# Patient Record
Sex: FEMALE | Race: WHITE | NOT HISPANIC OR LATINO | URBAN - METROPOLITAN AREA
[De-identification: names, ages, dates, MRNs, and addresses within clinical notes are randomized per-mention and may not be internally consistent; named-entity substitution may affect disease eponyms.]

---

## 2017-05-12 ENCOUNTER — APPOINTMENT (OUTPATIENT)
Age: 27
Setting detail: DERMATOLOGY
End: 2017-05-24

## 2017-05-12 DIAGNOSIS — D22 MELANOCYTIC NEVI: ICD-10-CM

## 2017-05-12 DIAGNOSIS — L57.8 OTHER SKIN CHANGES DUE TO CHRONIC EXPOSURE TO NONIONIZING RADIATION: ICD-10-CM

## 2017-05-12 DIAGNOSIS — L72.8 OTHER FOLLICULAR CYSTS OF THE SKIN AND SUBCUTANEOUS TISSUE: ICD-10-CM

## 2017-05-12 DIAGNOSIS — D485 NEOPLASM OF UNCERTAIN BEHAVIOR OF SKIN: ICD-10-CM

## 2017-05-12 PROBLEM — D22.5 MELANOCYTIC NEVI OF TRUNK: Status: ACTIVE | Noted: 2017-05-12

## 2017-05-12 PROBLEM — D48.5 NEOPLASM OF UNCERTAIN BEHAVIOR OF SKIN: Status: ACTIVE | Noted: 2017-05-12

## 2017-05-12 PROCEDURE — 99213 OFFICE O/P EST LOW 20 MIN: CPT | Mod: 25

## 2017-05-12 PROCEDURE — OTHER BIOPSY BY SHAVE METHOD: OTHER

## 2017-05-12 PROCEDURE — OTHER PRESCRIPTION: OTHER

## 2017-05-12 PROCEDURE — OTHER COUNSELING: OTHER

## 2017-05-12 PROCEDURE — 11100: CPT

## 2017-05-12 RX ORDER — AZELAIC ACID 0.15 G/G
AEROSOL, FOAM TOPICAL
Qty: 1 | Refills: 2 | Status: ERX | COMMUNITY
Start: 2017-05-12

## 2017-05-12 RX ORDER — CLINDAMYCIN PHOS/BENZOYL PEROX 1 %-5 %
GEL (GRAM) TOPICAL
Qty: 1 | Refills: 2 | Status: ERX | COMMUNITY
Start: 2017-05-12

## 2017-05-12 ASSESSMENT — LOCATION DETAILED DESCRIPTION DERM
LOCATION DETAILED: LEFT DISTAL DORSAL FOREARM
LOCATION DETAILED: SUPERIOR LUMBAR SPINE
LOCATION DETAILED: UPPER STERNUM
LOCATION DETAILED: RIGHT PROXIMAL DORSAL FOREARM
LOCATION DETAILED: LEFT MEDIAL BREAST 7-8:00 REGION
LOCATION DETAILED: RIGHT MEDIAL TRAPEZIAL NECK
LOCATION DETAILED: RIGHT INFERIOR MEDIAL FOREHEAD
LOCATION DETAILED: RIGHT RIB CAGE
LOCATION DETAILED: LEFT SUPERIOR UPPER BACK
LOCATION DETAILED: LEFT LATERAL ABDOMEN
LOCATION DETAILED: LEFT SUPERIOR LATERAL LOWER BACK

## 2017-05-12 ASSESSMENT — LOCATION ZONE DERM
LOCATION ZONE: FACE
LOCATION ZONE: NECK
LOCATION ZONE: ARM
LOCATION ZONE: TRUNK

## 2017-05-12 ASSESSMENT — LOCATION SIMPLE DESCRIPTION DERM
LOCATION SIMPLE: CHEST
LOCATION SIMPLE: LEFT UPPER BACK
LOCATION SIMPLE: POSTERIOR NECK
LOCATION SIMPLE: LOWER BACK
LOCATION SIMPLE: RIGHT FOREARM
LOCATION SIMPLE: RIGHT FOREHEAD
LOCATION SIMPLE: LEFT FOREARM
LOCATION SIMPLE: LEFT BREAST
LOCATION SIMPLE: ABDOMEN
LOCATION SIMPLE: LEFT LOWER BACK

## 2017-05-12 NOTE — PROCEDURE: BIOPSY BY SHAVE METHOD
Dressing: bandage
Silver Nitrate Text: The wound bed was treated with silver nitrate after the biopsy was performed.
Size Of Lesion In Cm: 0
Type Of Destruction Used: Curettage
Electrodesiccation And Curettage Text: The wound bed was treated with electrodesiccation and curettage after the biopsy was performed.
Destruction After The Procedure: No
Hemostasis: Drysol
Biopsy Method: Dermablade
Cryotherapy Text: The wound bed was treated with cryotherapy after the biopsy was performed.
Notification Instructions: Patient will be notified of biopsy results. However, patient instructed to call the office if not contacted within 2 weeks.
Electrodesiccation Text: The wound bed was treated with electrodesiccation after the biopsy was performed.
Wound Care: Vaseline
Detail Level: Detailed
Anesthesia Type: 1% Xylocaine with epinephrine
Render Post-Care Instructions In Note?: yes
Consent: Written consent was obtained and risks were reviewed including but not limited to scarring, infection, bleeding, scabbing, incomplete removal, nerve damage and allergy to anesthesia.
Biopsy Type: H and E
Anesthesia Volume In Cc (Will Not Render If 0): 0.5
Post-Care Instructions: I reviewed with the patient in detail post-care instructions. Patient is to keep the biopsy site dry overnight, and then apply bacitracin twice daily until healed. Patient may apply hydrogen peroxide soaks to remove any crusting.
Billing Type: Third-Party Bill

## 2018-03-15 ENCOUNTER — APPOINTMENT (OUTPATIENT)
Age: 28
Setting detail: DERMATOLOGY
End: 2018-04-13

## 2018-03-15 DIAGNOSIS — D485 NEOPLASM OF UNCERTAIN BEHAVIOR OF SKIN: ICD-10-CM

## 2018-03-15 DIAGNOSIS — L81.4 OTHER MELANIN HYPERPIGMENTATION: ICD-10-CM

## 2018-03-15 DIAGNOSIS — D22 MELANOCYTIC NEVI: ICD-10-CM

## 2018-03-15 PROBLEM — D22.71 MELANOCYTIC NEVI OF RIGHT LOWER LIMB, INCLUDING HIP: Status: ACTIVE | Noted: 2018-03-15

## 2018-03-15 PROBLEM — D48.5 NEOPLASM OF UNCERTAIN BEHAVIOR OF SKIN: Status: ACTIVE | Noted: 2018-03-15

## 2018-03-15 PROBLEM — D22.5 MELANOCYTIC NEVI OF TRUNK: Status: ACTIVE | Noted: 2018-03-15

## 2018-03-15 PROBLEM — D22.62 MELANOCYTIC NEVI OF LEFT UPPER LIMB, INCLUDING SHOULDER: Status: ACTIVE | Noted: 2018-03-15

## 2018-03-15 PROBLEM — D22.72 MELANOCYTIC NEVI OF LEFT LOWER LIMB, INCLUDING HIP: Status: ACTIVE | Noted: 2018-03-15

## 2018-03-15 PROBLEM — D22.61 MELANOCYTIC NEVI OF RIGHT UPPER LIMB, INCLUDING SHOULDER: Status: ACTIVE | Noted: 2018-03-15

## 2018-03-15 PROCEDURE — OTHER BIOPSY BY SHAVE METHOD: OTHER

## 2018-03-15 PROCEDURE — 99213 OFFICE O/P EST LOW 20 MIN: CPT | Mod: 25

## 2018-03-15 PROCEDURE — OTHER COUNSELING: OTHER

## 2018-03-15 PROCEDURE — OTHER MIPS QUALITY: OTHER

## 2018-03-15 PROCEDURE — 11101: CPT

## 2018-03-15 PROCEDURE — 11100: CPT

## 2018-03-15 ASSESSMENT — LOCATION DETAILED DESCRIPTION DERM
LOCATION DETAILED: LEFT PROXIMAL PRETIBIAL REGION
LOCATION DETAILED: LEFT PROXIMAL DORSAL FOREARM
LOCATION DETAILED: RIGHT PROXIMAL DORSAL FOREARM
LOCATION DETAILED: RIGHT PROXIMAL POSTERIOR UPPER ARM
LOCATION DETAILED: LEFT SUPERIOR MEDIAL UPPER BACK
LOCATION DETAILED: RIGHT PROXIMAL PRETIBIAL REGION
LOCATION DETAILED: RIGHT SUPERIOR MEDIAL UPPER BACK
LOCATION DETAILED: EPIGASTRIC SKIN
LOCATION DETAILED: LEFT ANTERIOR DISTAL THIGH
LOCATION DETAILED: LEFT SUPERIOR LATERAL UPPER BACK
LOCATION DETAILED: RIGHT ANTERIOR DISTAL THIGH
LOCATION DETAILED: LEFT SUPERIOR LATERAL LOWER BACK
LOCATION DETAILED: SUBXIPHOID
LOCATION DETAILED: SUPERIOR LUMBAR SPINE
LOCATION DETAILED: LEFT PROXIMAL POSTERIOR UPPER ARM

## 2018-03-15 ASSESSMENT — LOCATION SIMPLE DESCRIPTION DERM
LOCATION SIMPLE: LEFT THIGH
LOCATION SIMPLE: RIGHT FOREARM
LOCATION SIMPLE: RIGHT POSTERIOR UPPER ARM
LOCATION SIMPLE: LEFT PRETIBIAL REGION
LOCATION SIMPLE: ABDOMEN
LOCATION SIMPLE: RIGHT UPPER BACK
LOCATION SIMPLE: LEFT FOREARM
LOCATION SIMPLE: LEFT POSTERIOR UPPER ARM
LOCATION SIMPLE: RIGHT PRETIBIAL REGION
LOCATION SIMPLE: LEFT UPPER BACK
LOCATION SIMPLE: RIGHT THIGH
LOCATION SIMPLE: LEFT LOWER BACK
LOCATION SIMPLE: LOWER BACK

## 2018-03-15 ASSESSMENT — LOCATION ZONE DERM
LOCATION ZONE: ARM
LOCATION ZONE: TRUNK
LOCATION ZONE: LEG

## 2018-03-15 NOTE — PROCEDURE: MIPS QUALITY
Quality 431: Preventive Care And Screening: Unhealthy Alcohol Use - Screening: Patient screened for unhealthy alcohol use using a single question and scores 2 or greater episodes per year and brief intervention did not occur
Quality 226: Preventive Care And Screening: Tobacco Use: Screening And Cessation Intervention: Patient screened for tobacco and never smoked
Quality 110: Preventive Care And Screening: Influenza Immunization: Influenza Immunization previously received during influenza season
Detail Level: Detailed

## 2018-03-15 NOTE — PROCEDURE: BIOPSY BY SHAVE METHOD
Render Post-Care Instructions In Note?: yes
Electrodesiccation Text: The wound bed was treated with electrodesiccation after the biopsy was performed.
Anesthesia Volume In Cc (Will Not Render If 0): 0.3
Hemostasis: Drysol
Billing Type: Third-Party Bill
Biopsy Type: H and E
Cryotherapy Text: The wound bed was treated with cryotherapy after the biopsy was performed.
Silver Nitrate Text: The wound bed was treated with silver nitrate after the biopsy was performed.
Notification Instructions: Patient will be notified of biopsy results. However, patient instructed to call the office if not contacted within 2 weeks.
Detail Level: Detailed
Dressing: bandage
Electrodesiccation And Curettage Text: The wound bed was treated with electrodesiccation and curettage after the biopsy was performed.
Bill For Surgical Tray: no
Wound Care: Vaseline
Consent: Written consent was obtained and risks were reviewed including but not limited to scarring, infection, bleeding, scabbing, incomplete removal, nerve damage and allergy to anesthesia.
Type Of Destruction Used: Curettage
Post-Care Instructions: I reviewed with the patient in detail post-care instructions. Patient is to keep the biopsy site dry overnight, and then apply Vaseline twice daily until healed. Patient may apply hydrogen peroxide soaks to remove any crusting.
Anesthesia Type: 2% lidocaine with epinephrine
X Size Of Lesion In Cm: 0

## 2018-09-13 ENCOUNTER — APPOINTMENT (OUTPATIENT)
Age: 28
Setting detail: DERMATOLOGY
End: 2018-09-23

## 2018-09-13 DIAGNOSIS — Z87.2 PERSONAL HISTORY OF DISEASES OF THE SKIN AND SUBCUTANEOUS TISSUE: ICD-10-CM

## 2018-09-13 DIAGNOSIS — D22 MELANOCYTIC NEVI: ICD-10-CM

## 2018-09-13 DIAGNOSIS — L81.4 OTHER MELANIN HYPERPIGMENTATION: ICD-10-CM

## 2018-09-13 PROBLEM — D22.71 MELANOCYTIC NEVI OF RIGHT LOWER LIMB, INCLUDING HIP: Status: ACTIVE | Noted: 2018-09-13

## 2018-09-13 PROCEDURE — 99213 OFFICE O/P EST LOW 20 MIN: CPT

## 2018-09-13 PROCEDURE — OTHER COUNSELING: OTHER

## 2018-09-13 ASSESSMENT — LOCATION SIMPLE DESCRIPTION DERM
LOCATION SIMPLE: LEFT UPPER BACK
LOCATION SIMPLE: LEFT LOWER BACK
LOCATION SIMPLE: LOWER BACK
LOCATION SIMPLE: LEFT SHOULDER
LOCATION SIMPLE: RIGHT POPLITEAL SKIN

## 2018-09-13 ASSESSMENT — LOCATION ZONE DERM
LOCATION ZONE: TRUNK
LOCATION ZONE: LEG
LOCATION ZONE: ARM

## 2018-09-13 ASSESSMENT — LOCATION DETAILED DESCRIPTION DERM
LOCATION DETAILED: LEFT SUPERIOR LATERAL LOWER BACK
LOCATION DETAILED: LEFT SUPERIOR LATERAL UPPER BACK
LOCATION DETAILED: LEFT ANTERIOR SHOULDER
LOCATION DETAILED: SUPERIOR LUMBAR SPINE
LOCATION DETAILED: RIGHT POPLITEAL SKIN

## 2018-12-14 ENCOUNTER — OFFICE VISIT (OUTPATIENT)
Dept: URBAN - METROPOLITAN AREA CLINIC 32 | Facility: CLINIC | Age: 28
End: 2018-12-14
Payer: COMMERCIAL

## 2018-12-14 PROCEDURE — 92310 CONTACT LENS FITTING OU: CPT | Performed by: OPTOMETRIST

## 2018-12-14 PROCEDURE — 92014 COMPRE OPH EXAM EST PT 1/>: CPT | Performed by: OPTOMETRIST

## 2018-12-14 PROCEDURE — 92015 DETERMINE REFRACTIVE STATE: CPT | Performed by: OPTOMETRIST

## 2018-12-14 ASSESSMENT — INTRAOCULAR PRESSURE
OD: 15
OS: 15

## 2018-12-14 ASSESSMENT — VISUAL ACUITY
OD: 20/20
OS: 20/20

## 2019-03-15 ENCOUNTER — APPOINTMENT (OUTPATIENT)
Age: 29
Setting detail: DERMATOLOGY
End: 2019-03-18

## 2019-03-15 DIAGNOSIS — D485 NEOPLASM OF UNCERTAIN BEHAVIOR OF SKIN: ICD-10-CM

## 2019-03-15 DIAGNOSIS — L57.8 OTHER SKIN CHANGES DUE TO CHRONIC EXPOSURE TO NONIONIZING RADIATION: ICD-10-CM

## 2019-03-15 DIAGNOSIS — D18.0 HEMANGIOMA: ICD-10-CM

## 2019-03-15 DIAGNOSIS — Z71.89 OTHER SPECIFIED COUNSELING: ICD-10-CM

## 2019-03-15 DIAGNOSIS — D22 MELANOCYTIC NEVI: ICD-10-CM

## 2019-03-15 DIAGNOSIS — Z87.2 PERSONAL HISTORY OF DISEASES OF THE SKIN AND SUBCUTANEOUS TISSUE: ICD-10-CM

## 2019-03-15 PROBLEM — D22.72 MELANOCYTIC NEVI OF LEFT LOWER LIMB, INCLUDING HIP: Status: ACTIVE | Noted: 2019-03-15

## 2019-03-15 PROBLEM — D18.01 HEMANGIOMA OF SKIN AND SUBCUTANEOUS TISSUE: Status: ACTIVE | Noted: 2019-03-15

## 2019-03-15 PROBLEM — D22.61 MELANOCYTIC NEVI OF RIGHT UPPER LIMB, INCLUDING SHOULDER: Status: ACTIVE | Noted: 2019-03-15

## 2019-03-15 PROBLEM — D48.5 NEOPLASM OF UNCERTAIN BEHAVIOR OF SKIN: Status: ACTIVE | Noted: 2019-03-15

## 2019-03-15 PROBLEM — D22.5 MELANOCYTIC NEVI OF TRUNK: Status: ACTIVE | Noted: 2019-03-15

## 2019-03-15 PROBLEM — D22.71 MELANOCYTIC NEVI OF RIGHT LOWER LIMB, INCLUDING HIP: Status: ACTIVE | Noted: 2019-03-15

## 2019-03-15 PROBLEM — D22.62 MELANOCYTIC NEVI OF LEFT UPPER LIMB, INCLUDING SHOULDER: Status: ACTIVE | Noted: 2019-03-15

## 2019-03-15 PROCEDURE — OTHER BIOPSY BY SHAVE METHOD: OTHER

## 2019-03-15 PROCEDURE — 11102 TANGNTL BX SKIN SINGLE LES: CPT

## 2019-03-15 PROCEDURE — 99213 OFFICE O/P EST LOW 20 MIN: CPT | Mod: 25

## 2019-03-15 PROCEDURE — OTHER COUNSELING: OTHER

## 2019-03-15 PROCEDURE — OTHER MIPS QUALITY: OTHER

## 2019-03-15 ASSESSMENT — LOCATION DETAILED DESCRIPTION DERM
LOCATION DETAILED: RIGHT DISTAL DORSAL FOREARM
LOCATION DETAILED: LEFT SUPERIOR LATERAL LOWER BACK
LOCATION DETAILED: PERIUMBILICAL SKIN
LOCATION DETAILED: RIGHT DISTAL PRETIBIAL REGION
LOCATION DETAILED: LEFT LATERAL ABDOMEN
LOCATION DETAILED: LEFT ANTERIOR DISTAL THIGH
LOCATION DETAILED: SUPERIOR LUMBAR SPINE
LOCATION DETAILED: RIGHT PROXIMAL POSTERIOR UPPER ARM
LOCATION DETAILED: LEFT DISTAL PRETIBIAL REGION
LOCATION DETAILED: RIGHT ANTERIOR DISTAL THIGH
LOCATION DETAILED: EPIGASTRIC SKIN
LOCATION DETAILED: LEFT PROXIMAL POSTERIOR UPPER ARM
LOCATION DETAILED: LEFT SUPERIOR LATERAL UPPER BACK
LOCATION DETAILED: LEFT DISTAL DORSAL FOREARM
LOCATION DETAILED: LEFT INFERIOR MEDIAL UPPER BACK
LOCATION DETAILED: RIGHT SUPERIOR MEDIAL FOREHEAD

## 2019-03-15 ASSESSMENT — LOCATION ZONE DERM
LOCATION ZONE: LEG
LOCATION ZONE: ARM
LOCATION ZONE: FACE
LOCATION ZONE: TRUNK

## 2019-03-15 ASSESSMENT — LOCATION SIMPLE DESCRIPTION DERM
LOCATION SIMPLE: RIGHT POSTERIOR UPPER ARM
LOCATION SIMPLE: LEFT THIGH
LOCATION SIMPLE: LEFT UPPER BACK
LOCATION SIMPLE: RIGHT FOREHEAD
LOCATION SIMPLE: RIGHT THIGH
LOCATION SIMPLE: ABDOMEN
LOCATION SIMPLE: LEFT PRETIBIAL REGION
LOCATION SIMPLE: LEFT LOWER BACK
LOCATION SIMPLE: LEFT POSTERIOR UPPER ARM
LOCATION SIMPLE: LEFT FOREARM
LOCATION SIMPLE: LOWER BACK
LOCATION SIMPLE: RIGHT PRETIBIAL REGION
LOCATION SIMPLE: RIGHT FOREARM

## 2019-03-15 NOTE — PROCEDURE: BIOPSY BY SHAVE METHOD
Depth Of Biopsy: dermis
Anesthesia Type: 1% Xylocaine without epinephrine
Anesthesia Volume In Cc (Will Not Render If 0): 0.5
Wound Care: Vaseline
Additional Anesthesia Volume In Cc (Will Not Render If 0): 0
Consent: Written consent was obtained and risks were reviewed including but not limited to scarring, infection, bleeding, scabbing, incomplete removal, nerve damage and allergy to anesthesia.
Biopsy Type: H and E
Hemostasis: Electrocautery
Billing Type: Third-Party Bill
Silver Nitrate Text: The wound bed was treated with silver nitrate after the biopsy was performed.
Notification Instructions: Patient will be notified of biopsy results. However, patient instructed to call the office if not contacted within 2 weeks.
Cryotherapy Text: The wound bed was treated with cryotherapy after the biopsy was performed.
Bill [id number] For Specimen Handling/Conveyance To Laboratory?: no
Render Post-Care Instructions In Note?: yes
Dressing: bandage
Electrodesiccation And Curettage Text: The wound bed was treated with electrodesiccation and curettage after the biopsy was performed.
Post-Care Instructions: I reviewed with the patient in detail post-care instructions. Patient is to keep the biopsy site dry overnight, and then apply Vaseline twice daily until healed. Patient may apply hydrogen peroxide soaks to remove any crusting.
Detail Level: Detailed
Electrodesiccation Text: The wound bed was treated with electrodesiccation after the biopsy was performed.
Biopsy Method: Personna blade
Type Of Destruction Used: Curettage

## 2019-04-25 ENCOUNTER — APPOINTMENT (OUTPATIENT)
Age: 29
Setting detail: DERMATOLOGY
End: 2019-04-30

## 2019-04-25 DIAGNOSIS — D22 MELANOCYTIC NEVI: ICD-10-CM

## 2019-04-25 PROBLEM — D22.5 MELANOCYTIC NEVI OF TRUNK: Status: ACTIVE | Noted: 2019-04-25

## 2019-04-25 PROCEDURE — 11402 EXC TR-EXT B9+MARG 1.1-2 CM: CPT

## 2019-04-25 PROCEDURE — OTHER EXCISION: OTHER

## 2019-04-25 PROCEDURE — 12031 INTMD RPR S/A/T/EXT 2.5 CM/<: CPT

## 2019-04-25 PROCEDURE — OTHER COUNSELING: OTHER

## 2019-04-25 ASSESSMENT — LOCATION ZONE DERM: LOCATION ZONE: TRUNK

## 2019-04-25 ASSESSMENT — LOCATION DETAILED DESCRIPTION DERM: LOCATION DETAILED: LEFT LATERAL ABDOMEN

## 2019-04-25 ASSESSMENT — LOCATION SIMPLE DESCRIPTION DERM: LOCATION SIMPLE: ABDOMEN

## 2019-04-25 NOTE — PROCEDURE: EXCISION
Anesthesia Type: 1% lidocaine with 1:300,000 epinephrine and a 1:10 solution of 8.4% sodium bicarbonate

## 2019-04-25 NOTE — PROCEDURE: EXCISION
Medical Necessity Clause: This procedure was medically necessary because the lesion that was treated was: moderate Dysplastic Nevus

## 2019-05-07 ENCOUNTER — APPOINTMENT (OUTPATIENT)
Age: 29
Setting detail: DERMATOLOGY
End: 2019-05-09

## 2019-05-07 DIAGNOSIS — Z48.02 ENCOUNTER FOR REMOVAL OF SUTURES: ICD-10-CM

## 2019-05-07 PROCEDURE — OTHER SUTURE REMOVAL (GLOBAL PERIOD): OTHER

## 2019-05-07 PROCEDURE — OTHER COUNSELING: OTHER

## 2019-05-07 ASSESSMENT — LOCATION SIMPLE DESCRIPTION DERM: LOCATION SIMPLE: ABDOMEN

## 2019-05-07 ASSESSMENT — LOCATION DETAILED DESCRIPTION DERM: LOCATION DETAILED: PERIUMBILICAL SKIN

## 2019-05-07 ASSESSMENT — LOCATION ZONE DERM: LOCATION ZONE: TRUNK

## 2019-05-07 NOTE — PROCEDURE: SUTURE REMOVAL (GLOBAL PERIOD)
Add 17947 Cpt? (Important Note: In 2017 The Use Of 01792 Is Being Tracked By Cms To Determine Future Global Period Reimbursement For Global Periods): no
Detail Level: Simple

## 2019-09-13 ENCOUNTER — APPOINTMENT (OUTPATIENT)
Age: 29
Setting detail: DERMATOLOGY
End: 2019-09-13

## 2019-09-13 DIAGNOSIS — Z87.2 PERSONAL HISTORY OF DISEASES OF THE SKIN AND SUBCUTANEOUS TISSUE: ICD-10-CM

## 2019-09-13 PROCEDURE — OTHER COUNSELING: OTHER

## 2019-09-13 ASSESSMENT — LOCATION DETAILED DESCRIPTION DERM: LOCATION DETAILED: LEFT SUPERIOR LATERAL LOWER BACK

## 2019-09-13 ASSESSMENT — LOCATION SIMPLE DESCRIPTION DERM: LOCATION SIMPLE: LEFT LOWER BACK

## 2019-09-13 ASSESSMENT — LOCATION ZONE DERM: LOCATION ZONE: TRUNK

## 2019-10-23 ENCOUNTER — APPOINTMENT (OUTPATIENT)
Age: 29
Setting detail: DERMATOLOGY
End: 2019-10-28

## 2019-10-23 DIAGNOSIS — Z71.89 OTHER SPECIFIED COUNSELING: ICD-10-CM

## 2019-10-23 DIAGNOSIS — D485 NEOPLASM OF UNCERTAIN BEHAVIOR OF SKIN: ICD-10-CM

## 2019-10-23 DIAGNOSIS — Z87.2 PERSONAL HISTORY OF DISEASES OF THE SKIN AND SUBCUTANEOUS TISSUE: ICD-10-CM

## 2019-10-23 DIAGNOSIS — D22 MELANOCYTIC NEVI: ICD-10-CM

## 2019-10-23 DIAGNOSIS — L60.0 INGROWING NAIL: ICD-10-CM

## 2019-10-23 DIAGNOSIS — L57.8 OTHER SKIN CHANGES DUE TO CHRONIC EXPOSURE TO NONIONIZING RADIATION: ICD-10-CM

## 2019-10-23 PROBLEM — D22.5 MELANOCYTIC NEVI OF TRUNK: Status: ACTIVE | Noted: 2019-10-23

## 2019-10-23 PROBLEM — D48.5 NEOPLASM OF UNCERTAIN BEHAVIOR OF SKIN: Status: ACTIVE | Noted: 2019-10-23

## 2019-10-23 PROCEDURE — OTHER COUNSELING: OTHER

## 2019-10-23 PROCEDURE — 11401 EXC TR-EXT B9+MARG 0.6-1 CM: CPT

## 2019-10-23 PROCEDURE — 99214 OFFICE O/P EST MOD 30 MIN: CPT | Mod: 25

## 2019-10-23 PROCEDURE — OTHER TREATMENT REGIMEN: OTHER

## 2019-10-23 PROCEDURE — 11402 EXC TR-EXT B9+MARG 1.1-2 CM: CPT

## 2019-10-23 PROCEDURE — OTHER EXCISION: OTHER

## 2019-10-23 ASSESSMENT — LOCATION SIMPLE DESCRIPTION DERM
LOCATION SIMPLE: RIGHT GREAT TOE
LOCATION SIMPLE: LOWER BACK
LOCATION SIMPLE: ABDOMEN
LOCATION SIMPLE: LEFT LOWER BACK
LOCATION SIMPLE: UPPER BACK
LOCATION SIMPLE: LEFT BUTTOCK
LOCATION SIMPLE: LEFT UPPER BACK
LOCATION SIMPLE: CHEST
LOCATION SIMPLE: LEFT FOREHEAD
LOCATION SIMPLE: RIGHT UPPER BACK

## 2019-10-23 ASSESSMENT — LOCATION DETAILED DESCRIPTION DERM
LOCATION DETAILED: INFERIOR THORACIC SPINE
LOCATION DETAILED: LEFT LATERAL ABDOMEN
LOCATION DETAILED: EPIGASTRIC SKIN
LOCATION DETAILED: LEFT BUTTOCK
LOCATION DETAILED: RIGHT MEDIAL UPPER BACK
LOCATION DETAILED: RIGHT LATERAL GREAT TOE
LOCATION DETAILED: LEFT MEDIAL FOREHEAD
LOCATION DETAILED: SUPERIOR LUMBAR SPINE
LOCATION DETAILED: LEFT SUPERIOR LATERAL UPPER BACK
LOCATION DETAILED: LEFT MEDIAL SUPERIOR CHEST
LOCATION DETAILED: LEFT SUPERIOR LATERAL LOWER BACK

## 2019-10-23 ASSESSMENT — LOCATION ZONE DERM
LOCATION ZONE: TRUNK
LOCATION ZONE: TOE
LOCATION ZONE: FACE

## 2019-10-23 NOTE — PROCEDURE: TREATMENT REGIMEN
Detail Level: Simple
Otc Regimen: Recommended OTC Epsom Salt soaks
Plan: Patient stated the toe is not bothering her too much. She was advised to see a Podiatrist if it gets worse

## 2019-12-11 ENCOUNTER — OFFICE VISIT (OUTPATIENT)
Dept: URBAN - METROPOLITAN AREA CLINIC 32 | Facility: CLINIC | Age: 29
End: 2019-12-11

## 2019-12-11 PROCEDURE — 92014 COMPRE OPH EXAM EST PT 1/>: CPT | Performed by: OPTOMETRIST

## 2019-12-11 PROCEDURE — 92310 CONTACT LENS FITTING OU: CPT | Performed by: OPTOMETRIST

## 2019-12-11 ASSESSMENT — KERATOMETRY
OS: 44.63
OD: 44.50

## 2019-12-11 ASSESSMENT — INTRAOCULAR PRESSURE
OD: 15
OS: 15

## 2019-12-11 ASSESSMENT — VISUAL ACUITY
OS: 20/20
OD: 20/25

## 2020-07-31 NOTE — PROCEDURE: EXCISION
Information: Selecting Yes will display possible errors in your note based on the variables you have selected. This validation is only offered as a suggestion for you. PLEASE NOTE THAT THE VALIDATION TEXT WILL BE REMOVED WHEN YOU FINALIZE YOUR NOTE. IF YOU WANT TO FAX A PRELIMINARY NOTE YOU WILL NEED TO TOGGLE THIS TO 'NO' IF YOU DO NOT WANT IT IN YOUR FAXED NOTE.
Composite Graft Text: The defect edges were debeveled with a #15 scalpel blade.  Given the location of the defect, shape of the defect, the proximity to free margins and the fact the defect was full thickness a composite graft was deemed most appropriate.  The defect was outline and then transferred to the donor site.  A full thickness graft was then excised from the donor site. The graft was then placed in the primary defect, oriented appropriately and then sutured into place.  The secondary defect was then repaired using a primary closure.
Mercedes Flap Text: The defect edges were debeveled with a #15 scalpel blade.  Given the location of the defect, shape of the defect and the proximity to free margins a Mercedes flap was deemed most appropriate.  Using a sterile surgical marker, an appropriate advancement flap was drawn incorporating the defect and placing the expected incisions within the relaxed skin tension lines where possible. The area thus outlined was incised deep to adipose tissue with a #15 scalpel blade.  The skin margins were undermined to an appropriate distance in all directions utilizing iris scissors.
Complex Repair And O-T Advancement Flap Text: The defect edges were debeveled with a #15 scalpel blade.  The primary defect was closed partially with a complex linear closure.  Given the location of the remaining defect, shape of the defect and the proximity to free margins an O-T advancement flap was deemed most appropriate for complete closure of the defect.  Using a sterile surgical marker, an appropriate advancement flap was drawn incorporating the defect and placing the expected incisions within the relaxed skin tension lines where possible.    The area thus outlined was incised deep to adipose tissue with a #15 scalpel blade.  The skin margins were undermined to an appropriate distance in all directions utilizing iris scissors.
Double Island Pedicle Flap Text: The defect edges were debeveled with a #15 scalpel blade.  Given the location of the defect, shape of the defect and the proximity to free margins a double island pedicle advancement flap was deemed most appropriate.  Using a sterile surgical marker, an appropriate advancement flap was drawn incorporating the defect, outlining the appropriate donor tissue and placing the expected incisions within the relaxed skin tension lines where possible.    The area thus outlined was incised deep to adipose tissue with a #15 scalpel blade.  The skin margins were undermined to an appropriate distance in all directions around the primary defect and laterally outward around the island pedicle utilizing iris scissors.  There was minimal undermining beneath the pedicle flap.
Cheek Interpolation Flap Text: A decision was made to reconstruct the defect utilizing an interpolation axial flap and a staged reconstruction.  A telfa template was made of the defect.  This telfa template was then used to outline the Cheek Interpolation flap.  The donor area for the pedicle flap was then injected with anesthesia.  The flap was excised through the skin and subcutaneous tissue down to the layer of the underlying musculature.  The interpolation flap was carefully excised within this deep plane to maintain its blood supply.  The edges of the donor site were undermined.   The donor site was closed in a primary fashion.  The pedicle was then rotated into position and sutured.  Once the tube was sutured into place, adequate blood supply was confirmed with blanching and refill.  The pedicle was then wrapped with xeroform gauze and dressed appropriately with a telfa and gauze bandage to ensure continued blood supply and protect the attached pedicle.
Dorsal Nasal Flap Text: The defect edges were debeveled with a #15 scalpel blade.  Given the location of the defect and the proximity to free margins a dorsal nasal flap was deemed most appropriate.  Using a sterile surgical marker, an appropriate dorsal nasal flap was drawn around the defect.    The area thus outlined was incised deep to adipose tissue with a #15 scalpel blade.  The skin margins were undermined to an appropriate distance in all directions utilizing iris scissors.
Ftsg Text: The defect edges were debeveled with a #15 scalpel blade.  Given the location of the defect, shape of the defect and the proximity to free margins a full thickness skin graft was deemed most appropriate.  Using a sterile surgical marker, the primary defect shape was transferred to the donor site. The area thus outlined was incised deep to adipose tissue with a #15 scalpel blade.  The harvested graft was then trimmed of adipose tissue until only dermis and epidermis was left.  The skin margins of the secondary defect were undermined to an appropriate distance in all directions utilizing iris scissors.  The secondary defect was closed with interrupted buried subcutaneous sutures.  The skin edges were then re-apposed with running  sutures.  The skin graft was then placed in the primary defect and oriented appropriately.
Size Of Lesion In Cm: 0.6
Show Accession Variable: Yes
O-Z Flap Text: The defect edges were debeveled with a #15 scalpel blade.  Given the location of the defect, shape of the defect and the proximity to free margins an O-Z flap was deemed most appropriate.  Using a sterile surgical marker, an appropriate transposition flap was drawn incorporating the defect and placing the expected incisions within the relaxed skin tension lines where possible. The area thus outlined was incised deep to adipose tissue with a #15 scalpel blade.  The skin margins were undermined to an appropriate distance in all directions utilizing iris scissors.
Spiral Flap Text: The defect edges were debeveled with a #15 scalpel blade.  Given the location of the defect, shape of the defect and the proximity to free margins a spiral flap was deemed most appropriate.  Using a sterile surgical marker, an appropriate rotation flap was drawn incorporating the defect and placing the expected incisions within the relaxed skin tension lines where possible. The area thus outlined was incised deep to adipose tissue with a #15 scalpel blade.  The skin margins were undermined to an appropriate distance in all directions utilizing iris scissors.
Interpolation Flap Text: A decision was made to reconstruct the defect utilizing an interpolation axial flap and a staged reconstruction.  A telfa template was made of the defect.  This telfa template was then used to outline the interpolation flap.  The donor area for the pedicle flap was then injected with anesthesia.  The flap was excised through the skin and subcutaneous tissue down to the layer of the underlying musculature.  The interpolation flap was carefully excised within this deep plane to maintain its blood supply.  The edges of the donor site were undermined.   The donor site was closed in a primary fashion.  The pedicle was then rotated into position and sutured.  Once the tube was sutured into place, adequate blood supply was confirmed with blanching and refill.  The pedicle was then wrapped with xeroform gauze and dressed appropriately with a telfa and gauze bandage to ensure continued blood supply and protect the attached pedicle.
O-Z Plasty Text: The defect edges were debeveled with a #15 scalpel blade.  Given the location of the defect, shape of the defect and the proximity to free margins an O-Z plasty (double transposition flap) was deemed most appropriate.  Using a sterile surgical marker, the appropriate transposition flaps were drawn incorporating the defect and placing the expected incisions within the relaxed skin tension lines where possible.    The area thus outlined was incised deep to adipose tissue with a #15 scalpel blade.  The skin margins were undermined to an appropriate distance in all directions utilizing iris scissors.  Hemostasis was achieved with electrocautery.  The flaps were then transposed into place, one clockwise and the other counterclockwise, and anchored with interrupted buried subcutaneous sutures.
Tissue Cultured Epidermal Autograft Text: The defect edges were debeveled with a #15 scalpel blade.  Given the location of the defect, shape of the defect and the proximity to free margins a tissue cultured epidermal autograft was deemed most appropriate.  The graft was then trimmed to fit the size of the defect.  The graft was then placed in the primary defect and oriented appropriately.
Lip Wedge Excision Repair Text: Given the location of the defect and the proximity to free margins a full thickness wedge repair was deemed most appropriate.  Using a sterile surgical marker, the appropriate repair was drawn incorporating the defect and placing the expected incisions perpendicular to the vermillion border.  The vermillion border was also meticulously outlined to ensure appropriate reapproximation during the repair.  The area thus outlined was incised through and through with a #15 scalpel blade.  The muscularis and dermis were reaproximated with deep sutures following hemostasis. Care was taken to realign the vermillion border before proceeding with the superficial closure.  Once the vermillion was realigned the superfical and mucosal closure was finished.
Perilesional Excision Additional Text (Leave Blank If You Do Not Want): The margin was drawn around the clinically apparent lesion. Incisions were then made along these lines to the appropriate tissue plane and the lesion was extirpated.
Anesthesia Volume In Cc: 0
Epidermal Autograft Text: The defect edges were debeveled with a #15 scalpel blade.  Given the location of the defect, shape of the defect and the proximity to free margins an epidermal autograft was deemed most appropriate.  Using a sterile surgical marker, the primary defect shape was transferred to the donor site. The epidermal graft was then harvested.  The skin graft was then placed in the primary defect and oriented appropriately.
Crescentic Complex Repair Preamble Text (Leave Blank If You Do Not Want): Extensive wide undermining was performed.
Complex Repair And Single Advancement Flap Text: The defect edges were debeveled with a #15 scalpel blade.  The primary defect was closed partially with a complex linear closure.  Given the location of the remaining defect, shape of the defect and the proximity to free margins a single advancement flap was deemed most appropriate for complete closure of the defect.  Using a sterile surgical marker, an appropriate advancement flap was drawn incorporating the defect and placing the expected incisions within the relaxed skin tension lines where possible.    The area thus outlined was incised deep to adipose tissue with a #15 scalpel blade.  The skin margins were undermined to an appropriate distance in all directions utilizing iris scissors.
Excisional Biopsy Additional Text (Leave Blank If You Do Not Want): The margin was drawn around the clinically apparent lesion. An elliptical shape was then drawn on the skin incorporating the lesion and margins.  Incisions were then made along these lines to the appropriate tissue plane and the lesion was extirpated.
Cartilage Graft Text: The defect edges were debeveled with a #15 scalpel blade.  Given the location of the defect, shape of the defect, the fact the defect involved a full thickness cartilage defect a cartilage graft was deemed most appropriate.  An appropriate donor site was identified, cleansed, and anesthetized. The cartilage graft was then harvested and transferred to the recipient site, oriented appropriately and then sutured into place.  The secondary defect was then repaired using a primary closure.
Complex Repair And Epidermal Autograft Text: The defect edges were debeveled with a #15 scalpel blade.  The primary defect was closed partially with a complex linear closure.  Given the location of the defect, shape of the defect and the proximity to free margins an epidermal autograft was deemed most appropriate to repair the remaining defect.  The graft was trimmed to fit the size of the remaining defect.  The graft was then placed in the primary defect, oriented appropriately, and sutured into place.
Muscle Hinge Flap Text: The defect edges were debeveled with a #15 scalpel blade.  Given the size, depth and location of the defect and the proximity to free margins a muscle hinge flap was deemed most appropriate.  Using a sterile surgical marker, an appropriate hinge flap was drawn incorporating the defect. The area thus outlined was incised with a #15 scalpel blade.  The skin margins were undermined to an appropriate distance in all directions utilizing iris scissors.
Z Plasty Text: The lesion was extirpated to the level of the fat with a #15 scalpel blade.  Given the location of the defect, shape of the defect and the proximity to free margins a Z-plasty was deemed most appropriate for repair.  Using a sterile surgical marker, the appropriate transposition arms of the Z-plasty were drawn incorporating the defect and placing the expected incisions within the relaxed skin tension lines where possible.    The area thus outlined was incised deep to adipose tissue with a #15 scalpel blade.  The skin margins were undermined to an appropriate distance in all directions utilizing iris scissors.  The opposing transposition arms were then transposed into place in opposite direction and anchored with interrupted buried subcutaneous sutures.
Render The Repair Note As A Separate Paragraph: No
Mucosal Advancement Flap Text: Given the location of the defect, shape of the defect and the proximity to free margins a mucosal advancement flap was deemed most appropriate. Incisions were made with a 15 blade scalpel in the appropriate fashion along the cutaneous vermillion border and the mucosal lip. The remaining actinically damaged mucosal tissue was excised.  The mucosal advancement flap was then elevated to the gingival sulcus with care taken to preserve the neurovascular structures and advanced into the primary defect. Care was taken to ensure that precise realignment of the vermillion border was achieved.
Mastoid Interpolation Flap Text: A decision was made to reconstruct the defect utilizing an interpolation axial flap and a staged reconstruction.  A telfa template was made of the defect.  This telfa template was then used to outline the mastoid interpolation flap.  The donor area for the pedicle flap was then injected with anesthesia.  The flap was excised through the skin and subcutaneous tissue down to the layer of the underlying musculature.  The pedicle flap was carefully excised within this deep plane to maintain its blood supply.  The edges of the donor site were undermined.   The donor site was closed in a primary fashion.  The pedicle was then rotated into position and sutured.  Once the tube was sutured into place, adequate blood supply was confirmed with blanching and refill.  The pedicle was then wrapped with xeroform gauze and dressed appropriately with a telfa and gauze bandage to ensure continued blood supply and protect the attached pedicle.
Excision Method: Saucerization
Complex Repair And Modified Advancement Flap Text: The defect edges were debeveled with a #15 scalpel blade.  The primary defect was closed partially with a complex linear closure.  Given the location of the remaining defect, shape of the defect and the proximity to free margins a modified advancement flap was deemed most appropriate for complete closure of the defect.  Using a sterile surgical marker, an appropriate advancement flap was drawn incorporating the defect and placing the expected incisions within the relaxed skin tension lines where possible.    The area thus outlined was incised deep to adipose tissue with a #15 scalpel blade.  The skin margins were undermined to an appropriate distance in all directions utilizing iris scissors.
M-Plasty Intermediate Repair Preamble Text (Leave Blank If You Do Not Want): Undermining was performed with blunt dissection.
Melolabial Transposition Flap Text: The defect edges were debeveled with a #15 scalpel blade.  Given the location of the defect and the proximity to free margins a melolabial flap was deemed most appropriate.  Using a sterile surgical marker, an appropriate melolabial transposition flap was drawn incorporating the defect.    The area thus outlined was incised deep to adipose tissue with a #15 scalpel blade.  The skin margins were undermined to an appropriate distance in all directions utilizing iris scissors.
Epidermal Closure: none-secondary intention
No Repair - Repaired With Adjacent Surgical Defect Text (Leave Blank If You Do Not Want): After the excision the defect was repaired concurrently with another surgical defect which was in close approximation.
Modified Advancement Flap Text: The defect edges were debeveled with a #15 scalpel blade.  Given the location of the defect, shape of the defect and the proximity to free margins a modified advancement flap was deemed most appropriate.  Using a sterile surgical marker, an appropriate advancement flap was drawn incorporating the defect and placing the expected incisions within the relaxed skin tension lines where possible.    The area thus outlined was incised deep to adipose tissue with a #15 scalpel blade.  The skin margins were undermined to an appropriate distance in all directions utilizing iris scissors.
Slit Excision Additional Text (Leave Blank If You Do Not Want): A linear line was drawn on the skin overlying the lesion. An incision was made slowly until the lesion was visualized.  Once visualized, the lesion was removed with blunt dissection.
Excision Depth: adipose tissue
Purse String (Simple) Text: Given the location of the defect and the characteristics of the surrounding skin a purse string simple closure was deemed most appropriate.  Undermining was performed circumferentially around the surgical defect.  A purse string suture was then placed and tightened.
Trilobed Flap Text: The defect edges were debeveled with a #15 scalpel blade.  Given the location of the defect and the proximity to free margins a trilobed flap was deemed most appropriate.  Using a sterile surgical marker, an appropriate trilobed flap drawn around the defect.    The area thus outlined was incised deep to adipose tissue with a #15 scalpel blade.  The skin margins were undermined to an appropriate distance in all directions utilizing iris scissors.
Dressing: Band-Aid
O-T Plasty Text: The defect edges were debeveled with a #15 scalpel blade.  Given the location of the defect, shape of the defect and the proximity to free margins an O-T plasty was deemed most appropriate.  Using a sterile surgical marker, an appropriate O-T plasty was drawn incorporating the defect and placing the expected incisions within the relaxed skin tension lines where possible.    The area thus outlined was incised deep to adipose tissue with a #15 scalpel blade.  The skin margins were undermined to an appropriate distance in all directions utilizing iris scissors.
Complex Repair And Ftsg Text: The defect edges were debeveled with a #15 scalpel blade.  The primary defect was closed partially with a complex linear closure.  Given the location of the defect, shape of the defect and the proximity to free margins a full thickness skin graft was deemed most appropriate to repair the remaining defect.  The graft was trimmed to fit the size of the remaining defect.  The graft was then placed in the primary defect, oriented appropriately, and sutured into place.
Wound Care: Vaseline
Split-Thickness Skin Graft Text: The defect edges were debeveled with a #15 scalpel blade.  Given the location of the defect, shape of the defect and the proximity to free margins a split thickness skin graft was deemed most appropriate.  Using a sterile surgical marker, the primary defect shape was transferred to the donor site. The split thickness graft was then harvested.  The skin graft was then placed in the primary defect and oriented appropriately.
Chonodrocutaneous Helical Advancement Flap Text: The defect edges were debeveled with a #15 scalpel blade.  Given the location of the defect and the proximity to free margins a chondrocutaneous helical advancement flap was deemed most appropriate.  Using a sterile surgical marker, the appropriate advancement flap was drawn incorporating the defect and placing the expected incisions within the relaxed skin tension lines where possible.    The area thus outlined was incised deep to adipose tissue with a #15 scalpel blade.  The skin margins were undermined to an appropriate distance in all directions utilizing iris scissors.
Transposition Flap Text: The defect edges were debeveled with a #15 scalpel blade.  Given the location of the defect and the proximity to free margins a transposition flap was deemed most appropriate.  Using a sterile surgical marker, an appropriate transposition flap was drawn incorporating the defect.    The area thus outlined was incised deep to adipose tissue with a #15 scalpel blade.  The skin margins were undermined to an appropriate distance in all directions utilizing iris scissors.
Anesthesia Volume In Cc: 0.5
Complex Repair And Rotation Flap Text: The defect edges were debeveled with a #15 scalpel blade.  The primary defect was closed partially with a complex linear closure.  Given the location of the remaining defect, shape of the defect and the proximity to free margins a rotation flap was deemed most appropriate for complete closure of the defect.  Using a sterile surgical marker, an appropriate advancement flap was drawn incorporating the defect and placing the expected incisions within the relaxed skin tension lines where possible.    The area thus outlined was incised deep to adipose tissue with a #15 scalpel blade.  The skin margins were undermined to an appropriate distance in all directions utilizing iris scissors.
Complex Repair And W Plasty Text: The defect edges were debeveled with a #15 scalpel blade.  The primary defect was closed partially with a complex linear closure.  Given the location of the remaining defect, shape of the defect and the proximity to free margins a W plasty was deemed most appropriate for complete closure of the defect.  Using a sterile surgical marker, an appropriate advancement flap was drawn incorporating the defect and placing the expected incisions within the relaxed skin tension lines where possible.    The area thus outlined was incised deep to adipose tissue with a #15 scalpel blade.  The skin margins were undermined to an appropriate distance in all directions utilizing iris scissors.
Keystone Flap Text: The defect edges were debeveled with a #15 scalpel blade.  Given the location of the defect, shape of the defect a keystone flap was deemed most appropriate.  Using a sterile surgical marker, an appropriate keystone flap was drawn incorporating the defect, outlining the appropriate donor tissue and placing the expected incisions within the relaxed skin tension lines where possible. The area thus outlined was incised deep to adipose tissue with a #15 scalpel blade.  The skin margins were undermined to an appropriate distance in all directions around the primary defect and laterally outward around the flap utilizing iris scissors.
Detail Level: Detailed
Island Pedicle Flap Text: The defect edges were debeveled with a #15 scalpel blade.  Given the location of the defect, shape of the defect and the proximity to free margins an island pedicle advancement flap was deemed most appropriate.  Using a sterile surgical marker, an appropriate advancement flap was drawn incorporating the defect, outlining the appropriate donor tissue and placing the expected incisions within the relaxed skin tension lines where possible.    The area thus outlined was incised deep to adipose tissue with a #15 scalpel blade.  The skin margins were undermined to an appropriate distance in all directions around the primary defect and laterally outward around the island pedicle utilizing iris scissors.  There was minimal undermining beneath the pedicle flap.
30
Home Suture Removal Text: Patient was provided a home suture removal kit and will remove their sutures at home.  If they have any questions or difficulties they will call the office.
O-L Flap Text: The defect edges were debeveled with a #15 scalpel blade.  Given the location of the defect, shape of the defect and the proximity to free margins an O-L flap was deemed most appropriate.  Using a sterile surgical marker, an appropriate advancement flap was drawn incorporating the defect and placing the expected incisions within the relaxed skin tension lines where possible.    The area thus outlined was incised deep to adipose tissue with a #15 scalpel blade.  The skin margins were undermined to an appropriate distance in all directions utilizing iris scissors.
Fusiform Excision Additional Text (Leave Blank If You Do Not Want): The margin was drawn around the clinically apparent lesion.  A fusiform shape was then drawn on the skin incorporating the lesion and margins.  Incisions were then made along these lines to the appropriate tissue plane and the lesion was extirpated.
Melolabial Interpolation Flap Text: A decision was made to reconstruct the defect utilizing an interpolation axial flap and a staged reconstruction.  A telfa template was made of the defect.  This telfa template was then used to outline the melolabial interpolation flap.  The donor area for the pedicle flap was then injected with anesthesia.  The flap was excised through the skin and subcutaneous tissue down to the layer of the underlying musculature.  The pedicle flap was carefully excised within this deep plane to maintain its blood supply.  The edges of the donor site were undermined.   The donor site was closed in a primary fashion.  The pedicle was then rotated into position and sutured.  Once the tube was sutured into place, adequate blood supply was confirmed with blanching and refill.  The pedicle was then wrapped with xeroform gauze and dressed appropriately with a telfa and gauze bandage to ensure continued blood supply and protect the attached pedicle.
Additional Anesthesia Volume In Cc: 1
O-T Advancement Flap Text: The defect edges were debeveled with a #15 scalpel blade.  Given the location of the defect, shape of the defect and the proximity to free margins an O-T advancement flap was deemed most appropriate.  Using a sterile surgical marker, an appropriate advancement flap was drawn incorporating the defect and placing the expected incisions within the relaxed skin tension lines where possible.    The area thus outlined was incised deep to adipose tissue with a #15 scalpel blade.  The skin margins were undermined to an appropriate distance in all directions utilizing iris scissors.
W Plasty Text: The lesion was extirpated to the level of the fat with a #15 scalpel blade.  Given the location of the defect, shape of the defect and the proximity to free margins a W-plasty was deemed most appropriate for repair.  Using a sterile surgical marker, the appropriate transposition arms of the W-plasty were drawn incorporating the defect and placing the expected incisions within the relaxed skin tension lines where possible.    The area thus outlined was incised deep to adipose tissue with a #15 scalpel blade.  The skin margins were undermined to an appropriate distance in all directions utilizing iris scissors.  The opposing transposition arms were then transposed into place in opposite direction and anchored with interrupted buried subcutaneous sutures.
Helical Rim Advancement Flap Text: The defect edges were debeveled with a #15 blade scalpel.  Given the location of the defect and the proximity to free margins (helical rim) a double helical rim advancement flap was deemed most appropriate.  Using a sterile surgical marker, the appropriate advancement flaps were drawn incorporating the defect and placing the expected incisions between the helical rim and antihelix where possible.  The area thus outlined was incised through and through with a #15 scalpel blade.  With a skin hook and iris scissors, the flaps were gently and sharply undermined and freed up.
Cheek-To-Nose Interpolation Flap Text: A decision was made to reconstruct the defect utilizing an interpolation axial flap and a staged reconstruction.  A telfa template was made of the defect.  This telfa template was then used to outline the Cheek-To-Nose Interpolation flap.  The donor area for the pedicle flap was then injected with anesthesia.  The flap was excised through the skin and subcutaneous tissue down to the layer of the underlying musculature.  The interpolation flap was carefully excised within this deep plane to maintain its blood supply.  The edges of the donor site were undermined.   The donor site was closed in a primary fashion.  The pedicle was then rotated into position and sutured.  Once the tube was sutured into place, adequate blood supply was confirmed with blanching and refill.  The pedicle was then wrapped with xeroform gauze and dressed appropriately with a telfa and gauze bandage to ensure continued blood supply and protect the attached pedicle.
V-Y Flap Text: The defect edges were debeveled with a #15 scalpel blade.  Given the location of the defect, shape of the defect and the proximity to free margins a V-Y flap was deemed most appropriate.  Using a sterile surgical marker, an appropriate advancement flap was drawn incorporating the defect and placing the expected incisions within the relaxed skin tension lines where possible.    The area thus outlined was incised deep to adipose tissue with a #15 scalpel blade.  The skin margins were undermined to an appropriate distance in all directions utilizing iris scissors.
Star Wedge Flap Text: The defect edges were debeveled with a #15 scalpel blade.  Given the location of the defect, shape of the defect and the proximity to free margins a star wedge flap was deemed most appropriate.  Using a sterile surgical marker, an appropriate rotation flap was drawn incorporating the defect and placing the expected incisions within the relaxed skin tension lines where possible. The area thus outlined was incised deep to adipose tissue with a #15 scalpel blade.  The skin margins were undermined to an appropriate distance in all directions utilizing iris scissors.
Medical Necessity Clause: This procedure was medically necessary because the lesion that was treated was:
Island Pedicle Flap With Canthal Suspension Text: The defect edges were debeveled with a #15 scalpel blade.  Given the location of the defect, shape of the defect and the proximity to free margins an island pedicle advancement flap was deemed most appropriate.  Using a sterile surgical marker, an appropriate advancement flap was drawn incorporating the defect, outlining the appropriate donor tissue and placing the expected incisions within the relaxed skin tension lines where possible. The area thus outlined was incised deep to adipose tissue with a #15 scalpel blade.  The skin margins were undermined to an appropriate distance in all directions around the primary defect and laterally outward around the island pedicle utilizing iris scissors.  There was minimal undermining beneath the pedicle flap. A suspension suture was placed in the canthal tendon to prevent tension and prevent ectropion.
Hatchet Flap Text: The defect edges were debeveled with a #15 scalpel blade.  Given the location of the defect, shape of the defect and the proximity to free margins a hatchet flap was deemed most appropriate.  Using a sterile surgical marker, an appropriate hatchet flap was drawn incorporating the defect and placing the expected incisions within the relaxed skin tension lines where possible.    The area thus outlined was incised deep to adipose tissue with a #15 scalpel blade.  The skin margins were undermined to an appropriate distance in all directions utilizing iris scissors.
Rotation Flap Text: The defect edges were debeveled with a #15 scalpel blade.  Given the location of the defect, shape of the defect and the proximity to free margins a rotation flap was deemed most appropriate.  Using a sterile surgical marker, an appropriate rotation flap was drawn incorporating the defect and placing the expected incisions within the relaxed skin tension lines where possible.    The area thus outlined was incised deep to adipose tissue with a #15 scalpel blade.  The skin margins were undermined to an appropriate distance in all directions utilizing iris scissors.
H Plasty Text: Given the location of the defect, shape of the defect and the proximity to free margins a H-plasty was deemed most appropriate for repair.  Using a sterile surgical marker, the appropriate advancement arms of the H-plasty were drawn incorporating the defect and placing the expected incisions within the relaxed skin tension lines where possible. The area thus outlined was incised deep to adipose tissue with a #15 scalpel blade. The skin margins were undermined to an appropriate distance in all directions utilizing iris scissors.  The opposing advancement arms were then advanced into place in opposite direction and anchored with interrupted buried subcutaneous sutures.
Medical Necessity Information: It is in your best interest to select a reason for this procedure from the list below. All of these items fulfill various CMS LCD requirements except lesion extends to a margin.
Complex Repair And Split-Thickness Skin Graft Text: The defect edges were debeveled with a #15 scalpel blade.  The primary defect was closed partially with a complex linear closure.  Given the location of the defect, shape of the defect and the proximity to free margins a split thickness skin graft was deemed most appropriate to repair the remaining defect.  The graft was trimmed to fit the size of the remaining defect.  The graft was then placed in the primary defect, oriented appropriately, and sutured into place.
Skin Substitute Text: The defect edges were debeveled with a #15 scalpel blade.  Given the location of the defect, shape of the defect and the proximity to free margins a skin substitute graft was deemed most appropriate.  The graft material was trimmed to fit the size of the defect. The graft was then placed in the primary defect and oriented appropriately.
Banner Transposition Flap Text: The defect edges were debeveled with a #15 scalpel blade.  Given the location of the defect and the proximity to free margins a Banner transposition flap was deemed most appropriate.  Using a sterile surgical marker, an appropriate flap drawn around the defect. The area thus outlined was incised deep to adipose tissue with a #15 scalpel blade.  The skin margins were undermined to an appropriate distance in all directions utilizing iris scissors.
V-Y Plasty Text: The defect edges were debeveled with a #15 scalpel blade.  Given the location of the defect, shape of the defect and the proximity to free margins an V-Y advancement flap was deemed most appropriate.  Using a sterile surgical marker, an appropriate advancement flap was drawn incorporating the defect and placing the expected incisions within the relaxed skin tension lines where possible.    The area thus outlined was incised deep to adipose tissue with a #15 scalpel blade.  The skin margins were undermined to an appropriate distance in all directions utilizing iris scissors.
Consent was obtained from the patient. The risks and benefits to therapy were discussed in detail. Specifically, the risks of infection, scarring, bleeding, prolonged wound healing, incomplete removal, allergy to anesthesia, nerve injury and recurrence were addressed. Prior to the procedure, the treatment site was clearly identified and confirmed by the patient. All components of Universal Protocol/PAUSE Rule completed.
Bilateral Helical Rim Advancement Flap Text: The defect edges were debeveled with a #15 blade scalpel.  Given the location of the defect and the proximity to free margins (helical rim) a bilateral helical rim advancement flap was deemed most appropriate.  Using a sterile surgical marker, the appropriate advancement flaps were drawn incorporating the defect and placing the expected incisions between the helical rim and antihelix where possible.  The area thus outlined was incised through and through with a #15 scalpel blade.  With a skin hook and iris scissors, the flaps were gently and sharply undermined and freed up.
Epidermal Closure Graft Donor Site (Optional): simple interrupted
Where Do You Want The Question To Include Opioid Counseling Located?: Case Summary Tab
Bilobed Flap Text: The defect edges were debeveled with a #15 scalpel blade.  Given the location of the defect and the proximity to free margins a bilobe flap was deemed most appropriate.  Using a sterile surgical marker, an appropriate bilobe flap drawn around the defect.    The area thus outlined was incised deep to adipose tissue with a #15 scalpel blade.  The skin margins were undermined to an appropriate distance in all directions utilizing iris scissors.
Bi-Rhombic Flap Text: The defect edges were debeveled with a #15 scalpel blade.  Given the location of the defect and the proximity to free margins a bi-rhombic flap was deemed most appropriate.  Using a sterile surgical marker, an appropriate rhombic flap was drawn incorporating the defect. The area thus outlined was incised deep to adipose tissue with a #15 scalpel blade.  The skin margins were undermined to an appropriate distance in all directions utilizing iris scissors.
Complex Repair And Skin Substitute Graft Text: The defect edges were debeveled with a #15 scalpel blade.  The primary defect was closed partially with a complex linear closure.  Given the location of the remaining defect, shape of the defect and the proximity to free margins a skin substitute graft was deemed most appropriate to repair the remaining defect.  The graft was trimmed to fit the size of the remaining defect.  The graft was then placed in the primary defect, oriented appropriately, and sutured into place.
Post-Care Instructions: I reviewed with the patient in detail post-care instructions. Patient is not to engage in any heavy lifting, exercise, or swimming for the next 14 days. Should the patient develop any fevers, chills, bleeding, severe pain patient will contact the office immediately.
Scalpel Size: dermablade
Complex Repair And Dorsal Nasal Flap Text: The defect edges were debeveled with a #15 scalpel blade.  The primary defect was closed partially with a complex linear closure.  Given the location of the remaining defect, shape of the defect and the proximity to free margins a dorsal nasal flap was deemed most appropriate for complete closure of the defect.  Using a sterile surgical marker, an appropriate flap was drawn incorporating the defect and placing the expected incisions within the relaxed skin tension lines where possible.    The area thus outlined was incised deep to adipose tissue with a #15 scalpel blade.  The skin margins were undermined to an appropriate distance in all directions utilizing iris scissors.
Crescentic Advancement Flap Text: The defect edges were debeveled with a #15 scalpel blade.  Given the location of the defect and the proximity to free margins a crescentic advancement flap was deemed most appropriate.  Using a sterile surgical marker, the appropriate advancement flap was drawn incorporating the defect and placing the expected incisions within the relaxed skin tension lines where possible.    The area thus outlined was incised deep to adipose tissue with a #15 scalpel blade.  The skin margins were undermined to an appropriate distance in all directions utilizing iris scissors.
Rhombic Flap Text: The defect edges were debeveled with a #15 scalpel blade.  Given the location of the defect and the proximity to free margins a rhombic flap was deemed most appropriate.  Using a sterile surgical marker, an appropriate rhombic flap was drawn incorporating the defect.    The area thus outlined was incised deep to adipose tissue with a #15 scalpel blade.  The skin margins were undermined to an appropriate distance in all directions utilizing iris scissors.
Complex Repair And Tissue Cultured Epidermal Autograft Text: The defect edges were debeveled with a #15 scalpel blade.  The primary defect was closed partially with a complex linear closure.  Given the location of the defect, shape of the defect and the proximity to free margins an tissue cultured epidermal autograft was deemed most appropriate to repair the remaining defect.  The graft was trimmed to fit the size of the remaining defect.  The graft was then placed in the primary defect, oriented appropriately, and sutured into place.
Double O-Z Plasty Text: The defect edges were debeveled with a #15 scalpel blade.  Given the location of the defect, shape of the defect and the proximity to free margins a Double O-Z plasty (double transposition flap) was deemed most appropriate.  Using a sterile surgical marker, the appropriate transposition flaps were drawn incorporating the defect and placing the expected incisions within the relaxed skin tension lines where possible. The area thus outlined was incised deep to adipose tissue with a #15 scalpel blade.  The skin margins were undermined to an appropriate distance in all directions utilizing iris scissors.  Hemostasis was achieved with electrocautery.  The flaps were then transposed into place, one clockwise and the other counterclockwise, and anchored with interrupted buried subcutaneous sutures.
Billing Type: Third-Party Bill
Complex Repair And Rhombic Flap Text: The defect edges were debeveled with a #15 scalpel blade.  The primary defect was closed partially with a complex linear closure.  Given the location of the remaining defect, shape of the defect and the proximity to free margins a rhombic flap was deemed most appropriate for complete closure of the defect.  Using a sterile surgical marker, an appropriate advancement flap was drawn incorporating the defect and placing the expected incisions within the relaxed skin tension lines where possible.    The area thus outlined was incised deep to adipose tissue with a #15 scalpel blade.  The skin margins were undermined to an appropriate distance in all directions utilizing iris scissors.
Anesthesia Type: 1% lidocaine with 1:100,000 epinephrine and a 1:10 solution of 8.4% sodium bicarbonate
S Plasty Text: Given the location and shape of the defect, and the orientation of relaxed skin tension lines, an S-plasty was deemed most appropriate for repair.  Using a sterile surgical marker, the appropriate outline of the S-plasty was drawn, incorporating the defect and placing the expected incisions within the relaxed skin tension lines where possible.  The area thus outlined was incised deep to adipose tissue with a #15 scalpel blade.  The skin margins were undermined to an appropriate distance in all directions utilizing iris scissors. The skin flaps were advanced over the defect.  The opposing margins were then approximated with interrupted buried subcutaneous sutures.
Complex Repair And Double M Plasty Text: The defect edges were debeveled with a #15 scalpel blade.  The primary defect was closed partially with a complex linear closure.  Given the location of the remaining defect, shape of the defect and the proximity to free margins a double M plasty was deemed most appropriate for complete closure of the defect.  Using a sterile surgical marker, an appropriate advancement flap was drawn incorporating the defect and placing the expected incisions within the relaxed skin tension lines where possible.    The area thus outlined was incised deep to adipose tissue with a #15 scalpel blade.  The skin margins were undermined to an appropriate distance in all directions utilizing iris scissors.
Hemostasis: Drysol and Electrocautery
Anesthesia Type: 1% lidocaine with epinephrine and a 1:10 solution of 8.4% sodium bicarbonate
Complex Repair And O-L Flap Text: The defect edges were debeveled with a #15 scalpel blade.  The primary defect was closed partially with a complex linear closure.  Given the location of the remaining defect, shape of the defect and the proximity to free margins an O-L flap was deemed most appropriate for complete closure of the defect.  Using a sterile surgical marker, an appropriate flap was drawn incorporating the defect and placing the expected incisions within the relaxed skin tension lines where possible.    The area thus outlined was incised deep to adipose tissue with a #15 scalpel blade.  The skin margins were undermined to an appropriate distance in all directions utilizing iris scissors.
Estimated Blood Loss (Cc): minimal
Complex Repair And M Plasty Text: The defect edges were debeveled with a #15 scalpel blade.  The primary defect was closed partially with a complex linear closure.  Given the location of the remaining defect, shape of the defect and the proximity to free margins an M plasty was deemed most appropriate for complete closure of the defect.  Using a sterile surgical marker, an appropriate advancement flap was drawn incorporating the defect and placing the expected incisions within the relaxed skin tension lines where possible.    The area thus outlined was incised deep to adipose tissue with a #15 scalpel blade.  The skin margins were undermined to an appropriate distance in all directions utilizing iris scissors.
Bilobed Transposition Flap Text: The defect edges were debeveled with a #15 scalpel blade.  Given the location of the defect and the proximity to free margins a bilobed transposition flap was deemed most appropriate.  Using a sterile surgical marker, an appropriate bilobe flap drawn around the defect.    The area thus outlined was incised deep to adipose tissue with a #15 scalpel blade.  The skin margins were undermined to an appropriate distance in all directions utilizing iris scissors.
Rhomboid Transposition Flap Text: The defect edges were debeveled with a #15 scalpel blade.  Given the location of the defect and the proximity to free margins a rhomboid transposition flap was deemed most appropriate.  Using a sterile surgical marker, an appropriate rhomboid flap was drawn incorporating the defect.    The area thus outlined was incised deep to adipose tissue with a #15 scalpel blade.  The skin margins were undermined to an appropriate distance in all directions utilizing iris scissors.
Complex Repair And Bilobe Flap Text: The defect edges were debeveled with a #15 scalpel blade.  The primary defect was closed partially with a complex linear closure.  Given the location of the remaining defect, shape of the defect and the proximity to free margins a bilobe flap was deemed most appropriate for complete closure of the defect.  Using a sterile surgical marker, an appropriate advancement flap was drawn incorporating the defect and placing the expected incisions within the relaxed skin tension lines where possible.    The area thus outlined was incised deep to adipose tissue with a #15 scalpel blade.  The skin margins were undermined to an appropriate distance in all directions utilizing iris scissors.
Complex Repair And Dermal Autograft Text: The defect edges were debeveled with a #15 scalpel blade.  The primary defect was closed partially with a complex linear closure.  Given the location of the defect, shape of the defect and the proximity to free margins an dermal autograft was deemed most appropriate to repair the remaining defect.  The graft was trimmed to fit the size of the remaining defect.  The graft was then placed in the primary defect, oriented appropriately, and sutured into place.
Xenograft Text: The defect edges were debeveled with a #15 scalpel blade.  Given the location of the defect, shape of the defect and the proximity to free margins a xenograft was deemed most appropriate.  The graft was then trimmed to fit the size of the defect.  The graft was then placed in the primary defect and oriented appropriately.
Complex Repair And Transposition Flap Text: The defect edges were debeveled with a #15 scalpel blade.  The primary defect was closed partially with a complex linear closure.  Given the location of the remaining defect, shape of the defect and the proximity to free margins a transposition flap was deemed most appropriate for complete closure of the defect.  Using a sterile surgical marker, an appropriate advancement flap was drawn incorporating the defect and placing the expected incisions within the relaxed skin tension lines where possible.    The area thus outlined was incised deep to adipose tissue with a #15 scalpel blade.  The skin margins were undermined to an appropriate distance in all directions utilizing iris scissors.
Complex Repair And V-Y Plasty Text: The defect edges were debeveled with a #15 scalpel blade.  The primary defect was closed partially with a complex linear closure.  Given the location of the remaining defect, shape of the defect and the proximity to free margins a V-Y plasty was deemed most appropriate for complete closure of the defect.  Using a sterile surgical marker, an appropriate advancement flap was drawn incorporating the defect and placing the expected incisions within the relaxed skin tension lines where possible.    The area thus outlined was incised deep to adipose tissue with a #15 scalpel blade.  The skin margins were undermined to an appropriate distance in all directions utilizing iris scissors.
Alar Island Pedicle Flap Text: The defect edges were debeveled with a #15 scalpel blade.  Given the location of the defect, shape of the defect and the proximity to the alar rim an island pedicle advancement flap was deemed most appropriate.  Using a sterile surgical marker, an appropriate advancement flap was drawn incorporating the defect, outlining the appropriate donor tissue and placing the expected incisions within the nasal ala running parallel to the alar rim. The area thus outlined was incised with a #15 scalpel blade.  The skin margins were undermined minimally to an appropriate distance in all directions around the primary defect and laterally outward around the island pedicle utilizing iris scissors.  There was minimal undermining beneath the pedicle flap.
Graft Donor Site Bandage (Optional-Leave Blank If You Don't Want In Note): a pressure bandage were applied to the donor site.
Path Notes (To The Dermatopathologist): Please check margins.
Island Pedicle Flap-Requiring Vessel Identification Text: The defect edges were debeveled with a #15 scalpel blade.  Given the location of the defect, shape of the defect and the proximity to free margins an island pedicle advancement flap was deemed most appropriate.  Using a sterile surgical marker, an appropriate advancement flap was drawn, based on the axial vessel mentioned above, incorporating the defect, outlining the appropriate donor tissue and placing the expected incisions within the relaxed skin tension lines where possible.    The area thus outlined was incised deep to adipose tissue with a #15 scalpel blade.  The skin margins were undermined to an appropriate distance in all directions around the primary defect and laterally outward around the island pedicle utilizing iris scissors.  There was minimal undermining beneath the pedicle flap.
Complex Repair And Z Plasty Text: The defect edges were debeveled with a #15 scalpel blade.  The primary defect was closed partially with a complex linear closure.  Given the location of the remaining defect, shape of the defect and the proximity to free margins a Z plasty was deemed most appropriate for complete closure of the defect.  Using a sterile surgical marker, an appropriate advancement flap was drawn incorporating the defect and placing the expected incisions within the relaxed skin tension lines where possible.    The area thus outlined was incised deep to adipose tissue with a #15 scalpel blade.  The skin margins were undermined to an appropriate distance in all directions utilizing iris scissors.
Eliptical Excision Additional Text (Leave Blank If You Do Not Want): The margin was drawn around the clinically apparent lesion.  An elliptical shape was then drawn on the skin incorporating the lesion and margins.  Incisions were then made along these lines to the appropriate tissue plane and the lesion was extirpated.
Complex Repair And Melolabial Flap Text: The defect edges were debeveled with a #15 scalpel blade.  The primary defect was closed partially with a complex linear closure.  Given the location of the remaining defect, shape of the defect and the proximity to free margins a melolabial flap was deemed most appropriate for complete closure of the defect.  Using a sterile surgical marker, an appropriate advancement flap was drawn incorporating the defect and placing the expected incisions within the relaxed skin tension lines where possible.    The area thus outlined was incised deep to adipose tissue with a #15 scalpel blade.  The skin margins were undermined to an appropriate distance in all directions utilizing iris scissors.
Double O-Z Flap Text: The defect edges were debeveled with a #15 scalpel blade.  Given the location of the defect, shape of the defect and the proximity to free margins a Double O-Z flap was deemed most appropriate.  Using a sterile surgical marker, an appropriate transposition flap was drawn incorporating the defect and placing the expected incisions within the relaxed skin tension lines where possible. The area thus outlined was incised deep to adipose tissue with a #15 scalpel blade.  The skin margins were undermined to an appropriate distance in all directions utilizing iris scissors.
Repair Performed By Another Provider Text (Leave Blank If You Do Not Want): After the tissue was excised the defect was repaired by another provider.
Saucerization Excision Additional Text (Leave Blank If You Do Not Want): The margin was drawn around the clinically apparent lesion.  Incisions were then made along these lines, in a tangential fashion, to the appropriate tissue plane and the lesion was extirpated.
Purse String (Intermediate) Text: Given the location of the defect and the characteristics of the surrounding skin a pursestring intermediate closure was deemed most appropriate.  Undermining was performed circumfirentially around the surgical defect.  A purstring suture was then placed and tightened.
Paramedian Forehead Flap Text: A decision was made to reconstruct the defect utilizing an interpolation axial flap and a staged reconstruction.  A telfa template was made of the defect.  This telfa template was then used to outline the paramedian forehead pedicle flap.  The donor area for the pedicle flap was then injected with anesthesia.  The flap was excised through the skin and subcutaneous tissue down to the layer of the underlying musculature.  The pedicle flap was carefully excised within this deep plane to maintain its blood supply.  The edges of the donor site were undermined.   The donor site was closed in a primary fashion.  The pedicle was then rotated into position and sutured.  Once the tube was sutured into place, adequate blood supply was confirmed with blanching and refill.  The pedicle was then wrapped with xeroform gauze and dressed appropriately with a telfa and gauze bandage to ensure continued blood supply and protect the attached pedicle.
Repair Type: None - Secondary Intention
Dermal Autograft Text: The defect edges were debeveled with a #15 scalpel blade.  Given the location of the defect, shape of the defect and the proximity to free margins a dermal autograft was deemed most appropriate.  Using a sterile surgical marker, the primary defect shape was transferred to the donor site. The area thus outlined was incised deep to adipose tissue with a #15 scalpel blade.  The harvested graft was then trimmed of adipose and epidermal tissue until only dermis was left.  The skin graft was then placed in the primary defect and oriented appropriately.
Ear Star Wedge Flap Text: The defect edges were debeveled with a #15 blade scalpel.  Given the location of the defect and the proximity to free margins (helical rim) an ear star wedge flap was deemed most appropriate.  Using a sterile surgical marker, the appropriate flap was drawn incorporating the defect and placing the expected incisions between the helical rim and antihelix where possible.  The area thus outlined was incised through and through with a #15 scalpel blade.
A-T Advancement Flap Text: The defect edges were debeveled with a #15 scalpel blade.  Given the location of the defect, shape of the defect and the proximity to free margins an A-T advancement flap was deemed most appropriate.  Using a sterile surgical marker, an appropriate advancement flap was drawn incorporating the defect and placing the expected incisions within the relaxed skin tension lines where possible.    The area thus outlined was incised deep to adipose tissue with a #15 scalpel blade.  The skin margins were undermined to an appropriate distance in all directions utilizing iris scissors.
Advancement Flap (Double) Text: The defect edges were debeveled with a #15 scalpel blade.  Given the location of the defect and the proximity to free margins a double advancement flap was deemed most appropriate.  Using a sterile surgical marker, the appropriate advancement flaps were drawn incorporating the defect and placing the expected incisions within the relaxed skin tension lines where possible.    The area thus outlined was incised deep to adipose tissue with a #15 scalpel blade.  The skin margins were undermined to an appropriate distance in all directions utilizing iris scissors.
Size Of Margin In Cm: 0.2
Anesthesia Type: 2% lidocaine without epinephrine and a 1:10 solution of 8.4% sodium bicarbonate
Burow's Advancement Flap Text: The defect edges were debeveled with a #15 scalpel blade.  Given the location of the defect and the proximity to free margins a Burow's advancement flap was deemed most appropriate.  Using a sterile surgical marker, the appropriate advancement flap was drawn incorporating the defect and placing the expected incisions within the relaxed skin tension lines where possible.    The area thus outlined was incised deep to adipose tissue with a #15 scalpel blade.  The skin margins were undermined to an appropriate distance in all directions utilizing iris scissors.
Advancement Flap (Single) Text: The defect edges were debeveled with a #15 scalpel blade.  Given the location of the defect and the proximity to free margins a single advancement flap was deemed most appropriate.  Using a sterile surgical marker, an appropriate advancement flap was drawn incorporating the defect and placing the expected incisions within the relaxed skin tension lines where possible.    The area thus outlined was incised deep to adipose tissue with a #15 scalpel blade.  The skin margins were undermined to an appropriate distance in all directions utilizing iris scissors.
Posterior Auricular Interpolation Flap Text: A decision was made to reconstruct the defect utilizing an interpolation axial flap and a staged reconstruction.  A telfa template was made of the defect.  This telfa template was then used to outline the posterior auricular interpolation flap.  The donor area for the pedicle flap was then injected with anesthesia.  The flap was excised through the skin and subcutaneous tissue down to the layer of the underlying musculature.  The pedicle flap was carefully excised within this deep plane to maintain its blood supply.  The edges of the donor site were undermined.   The donor site was closed in a primary fashion.  The pedicle was then rotated into position and sutured.  Once the tube was sutured into place, adequate blood supply was confirmed with blanching and refill.  The pedicle was then wrapped with xeroform gauze and dressed appropriately with a telfa and gauze bandage to ensure continued blood supply and protect the attached pedicle.
Complex Repair And Double Advancement Flap Text: The defect edges were debeveled with a #15 scalpel blade.  The primary defect was closed partially with a complex linear closure.  Given the location of the remaining defect, shape of the defect and the proximity to free margins a double advancement flap was deemed most appropriate for complete closure of the defect.  Using a sterile surgical marker, an appropriate advancement flap was drawn incorporating the defect and placing the expected incisions within the relaxed skin tension lines where possible.    The area thus outlined was incised deep to adipose tissue with a #15 scalpel blade.  The skin margins were undermined to an appropriate distance in all directions utilizing iris scissors.
Complex Repair And A-T Advancement Flap Text: The defect edges were debeveled with a #15 scalpel blade.  The primary defect was closed partially with a complex linear closure.  Given the location of the remaining defect, shape of the defect and the proximity to free margins an A-T advancement flap was deemed most appropriate for complete closure of the defect.  Using a sterile surgical marker, an appropriate advancement flap was drawn incorporating the defect and placing the expected incisions within the relaxed skin tension lines where possible.    The area thus outlined was incised deep to adipose tissue with a #15 scalpel blade.  The skin margins were undermined to an appropriate distance in all directions utilizing iris scissors.

## 2020-09-04 ENCOUNTER — APPOINTMENT (OUTPATIENT)
Age: 30
Setting detail: DERMATOLOGY
End: 2020-09-04

## 2020-09-04 DIAGNOSIS — L57.8 OTHER SKIN CHANGES DUE TO CHRONIC EXPOSURE TO NONIONIZING RADIATION: ICD-10-CM

## 2020-09-04 DIAGNOSIS — Z87.2 PERSONAL HISTORY OF DISEASES OF THE SKIN AND SUBCUTANEOUS TISSUE: ICD-10-CM

## 2020-09-04 DIAGNOSIS — D22 MELANOCYTIC NEVI: ICD-10-CM

## 2020-09-04 DIAGNOSIS — D18.0 HEMANGIOMA: ICD-10-CM

## 2020-09-04 DIAGNOSIS — L82.1 OTHER SEBORRHEIC KERATOSIS: ICD-10-CM

## 2020-09-04 DIAGNOSIS — Z71.89 OTHER SPECIFIED COUNSELING: ICD-10-CM

## 2020-09-04 DIAGNOSIS — D485 NEOPLASM OF UNCERTAIN BEHAVIOR OF SKIN: ICD-10-CM

## 2020-09-04 PROBLEM — D22.4 MELANOCYTIC NEVI OF SCALP AND NECK: Status: ACTIVE | Noted: 2020-09-04

## 2020-09-04 PROBLEM — D22.39 MELANOCYTIC NEVI OF OTHER PARTS OF FACE: Status: ACTIVE | Noted: 2020-09-04

## 2020-09-04 PROBLEM — D22.61 MELANOCYTIC NEVI OF RIGHT UPPER LIMB, INCLUDING SHOULDER: Status: ACTIVE | Noted: 2020-09-04

## 2020-09-04 PROBLEM — D18.01 HEMANGIOMA OF SKIN AND SUBCUTANEOUS TISSUE: Status: ACTIVE | Noted: 2020-09-04

## 2020-09-04 PROBLEM — D22.71 MELANOCYTIC NEVI OF RIGHT LOWER LIMB, INCLUDING HIP: Status: ACTIVE | Noted: 2020-09-04

## 2020-09-04 PROBLEM — D22.5 MELANOCYTIC NEVI OF TRUNK: Status: ACTIVE | Noted: 2020-09-04

## 2020-09-04 PROBLEM — D48.5 NEOPLASM OF UNCERTAIN BEHAVIOR OF SKIN: Status: ACTIVE | Noted: 2020-09-04

## 2020-09-04 PROBLEM — D22.72 MELANOCYTIC NEVI OF LEFT LOWER LIMB, INCLUDING HIP: Status: ACTIVE | Noted: 2020-09-04

## 2020-09-04 PROCEDURE — 11102 TANGNTL BX SKIN SINGLE LES: CPT

## 2020-09-04 PROCEDURE — OTHER MIPS QUALITY: OTHER

## 2020-09-04 PROCEDURE — OTHER BIOPSY BY SHAVE METHOD: OTHER

## 2020-09-04 PROCEDURE — 99214 OFFICE O/P EST MOD 30 MIN: CPT | Mod: 25

## 2020-09-04 PROCEDURE — OTHER COUNSELING: OTHER

## 2020-09-04 ASSESSMENT — LOCATION DETAILED DESCRIPTION DERM
LOCATION DETAILED: MIDDLE STERNUM
LOCATION DETAILED: LEFT LATERAL ABDOMEN
LOCATION DETAILED: MID TRAPEZIAL NECK
LOCATION DETAILED: SUBXIPHOID
LOCATION DETAILED: STERNAL NOTCH
LOCATION DETAILED: LEFT POSTERIOR SHOULDER
LOCATION DETAILED: RIGHT PROXIMAL LATERAL POSTERIOR UPPER ARM
LOCATION DETAILED: LEFT SUPERIOR LATERAL UPPER BACK
LOCATION DETAILED: RIGHT ANTERIOR DISTAL THIGH
LOCATION DETAILED: LEFT PROXIMAL PRETIBIAL REGION
LOCATION DETAILED: LEFT LATERAL FOREHEAD
LOCATION DETAILED: LEFT PROXIMAL POSTERIOR UPPER ARM
LOCATION DETAILED: RIGHT ANTERIOR PROXIMAL THIGH
LOCATION DETAILED: LEFT CENTRAL LATERAL NECK
LOCATION DETAILED: RIGHT MEDIAL UPPER BACK
LOCATION DETAILED: LEFT INFERIOR POSTAURICULAR SKIN
LOCATION DETAILED: EPIGASTRIC SKIN
LOCATION DETAILED: RIGHT DISTAL POSTERIOR UPPER ARM
LOCATION DETAILED: LEFT MEDIAL FOREHEAD
LOCATION DETAILED: LEFT LATERAL SUBMANDIBULAR CHEEK
LOCATION DETAILED: RIGHT DISTAL LATERAL POSTERIOR THIGH
LOCATION DETAILED: LEFT SUPERIOR LATERAL LOWER BACK
LOCATION DETAILED: LEFT MEDIAL SUPERIOR CHEST
LOCATION DETAILED: RIGHT PROXIMAL POSTERIOR UPPER ARM
LOCATION DETAILED: LEFT ANTERIOR PROXIMAL THIGH
LOCATION DETAILED: LEFT PROXIMAL CALF
LOCATION DETAILED: SUPERIOR LUMBAR SPINE
LOCATION DETAILED: LEFT SUPERIOR LATERAL NECK
LOCATION DETAILED: RIGHT MID-UPPER BACK

## 2020-09-04 ASSESSMENT — LOCATION SIMPLE DESCRIPTION DERM
LOCATION SIMPLE: LEFT SHOULDER
LOCATION SIMPLE: CHEST
LOCATION SIMPLE: TRAPEZIAL NECK
LOCATION SIMPLE: RIGHT UPPER BACK
LOCATION SIMPLE: SCALP
LOCATION SIMPLE: RIGHT THIGH
LOCATION SIMPLE: LEFT FOREHEAD
LOCATION SIMPLE: RIGHT POSTERIOR THIGH
LOCATION SIMPLE: LEFT PRETIBIAL REGION
LOCATION SIMPLE: LEFT LOWER BACK
LOCATION SIMPLE: LEFT CALF
LOCATION SIMPLE: LEFT POSTERIOR UPPER ARM
LOCATION SIMPLE: LEFT CHEEK
LOCATION SIMPLE: LOWER BACK
LOCATION SIMPLE: RIGHT POSTERIOR UPPER ARM
LOCATION SIMPLE: LEFT UPPER BACK
LOCATION SIMPLE: LEFT THIGH
LOCATION SIMPLE: ABDOMEN
LOCATION SIMPLE: NECK

## 2020-09-04 ASSESSMENT — LOCATION ZONE DERM
LOCATION ZONE: LEG
LOCATION ZONE: SCALP
LOCATION ZONE: NECK
LOCATION ZONE: ARM
LOCATION ZONE: FACE
LOCATION ZONE: TRUNK

## 2020-09-04 NOTE — PROCEDURE: BIOPSY BY SHAVE METHOD
Bill For Surgical Tray: no
Biopsy Type: H and E
Electrodesiccation Text: The wound bed was treated with electrodesiccation after the biopsy was performed.
Wound Care: Petrolatum
Curettage Text: The wound bed was treated with curettage after the biopsy was performed.
Detail Level: Detailed
Post-Care Instructions: I reviewed with the patient in detail post-care instructions. Patient is to keep the biopsy site dry overnight, and then apply bacitracin twice daily until healed. Patient may apply hydrogen peroxide soaks to remove any crusting.
Anesthesia Volume In Cc (Will Not Render If 0): 0.3
Dressing: bandage
Notification Instructions: Patient will be notified of biopsy results. However, patient instructed to call the office if not contacted within 2 weeks.
Electrodesiccation And Curettage Text: The wound bed was treated with electrodesiccation and curettage after the biopsy was performed.
Hemostasis: Drysol
Silver Nitrate Text: The wound bed was treated with silver nitrate after the biopsy was performed.
Anesthesia Type: 1% lidocaine with epinephrine
Biopsy Method: Dermablade
Size Of Lesion In Cm: 0
Depth Of Biopsy: dermis
Was A Bandage Applied: Yes
Type Of Destruction Used: Curettage
Consent: Written consent was obtained and risks were reviewed including but not limited to scarring, infection, bleeding, scabbing, incomplete removal, nerve damage and allergy to anesthesia.
Billing Type: Third-Party Bill
Cryotherapy Text: The wound bed was treated with cryotherapy after the biopsy was performed.
Information: Selecting Yes will display possible errors in your note based on the variables you have selected. This validation is only offered as a suggestion for you. PLEASE NOTE THAT THE VALIDATION TEXT WILL BE REMOVED WHEN YOU FINALIZE YOUR NOTE. IF YOU WANT TO FAX A PRELIMINARY NOTE YOU WILL NEED TO TOGGLE THIS TO 'NO' IF YOU DO NOT WANT IT IN YOUR FAXED NOTE.

## 2020-09-25 ENCOUNTER — APPOINTMENT (OUTPATIENT)
Age: 30
Setting detail: DERMATOLOGY
End: 2020-09-25

## 2020-09-25 DIAGNOSIS — D22 MELANOCYTIC NEVI: ICD-10-CM

## 2020-09-25 PROBLEM — D22.5 MELANOCYTIC NEVI OF TRUNK: Status: ACTIVE | Noted: 2020-09-25

## 2020-09-25 PROCEDURE — OTHER COUNSELING: OTHER

## 2020-09-25 PROCEDURE — 13101 CMPLX RPR TRUNK 2.6-7.5 CM: CPT

## 2020-09-25 PROCEDURE — OTHER EXCISION: OTHER

## 2020-09-25 PROCEDURE — 11402 EXC TR-EXT B9+MARG 1.1-2 CM: CPT

## 2020-09-25 ASSESSMENT — LOCATION DETAILED DESCRIPTION DERM: LOCATION DETAILED: RIGHT MID-UPPER BACK

## 2020-09-25 ASSESSMENT — LOCATION ZONE DERM: LOCATION ZONE: TRUNK

## 2020-09-25 ASSESSMENT — LOCATION SIMPLE DESCRIPTION DERM: LOCATION SIMPLE: RIGHT UPPER BACK

## 2020-10-08 ENCOUNTER — APPOINTMENT (OUTPATIENT)
Age: 30
Setting detail: DERMATOLOGY
End: 2020-10-21

## 2020-10-08 DIAGNOSIS — Z48.02 ENCOUNTER FOR REMOVAL OF SUTURES: ICD-10-CM

## 2020-10-08 PROCEDURE — OTHER COUNSELING: OTHER

## 2020-10-08 PROCEDURE — OTHER SUTURE REMOVAL (GLOBAL PERIOD): OTHER

## 2020-10-08 ASSESSMENT — LOCATION ZONE DERM: LOCATION ZONE: TRUNK

## 2020-10-08 ASSESSMENT — LOCATION SIMPLE DESCRIPTION DERM: LOCATION SIMPLE: RIGHT UPPER BACK

## 2020-10-08 ASSESSMENT — LOCATION DETAILED DESCRIPTION DERM: LOCATION DETAILED: RIGHT MID-UPPER BACK

## 2020-10-08 NOTE — PROCEDURE: SUTURE REMOVAL (GLOBAL PERIOD)
Add 52821 Cpt? (Important Note: In 2017 The Use Of 02059 Is Being Tracked By Cms To Determine Future Global Period Reimbursement For Global Periods): no
Detail Level: Detailed

## 2021-01-28 ENCOUNTER — OFFICE VISIT (OUTPATIENT)
Dept: URBAN - METROPOLITAN AREA CLINIC 32 | Facility: CLINIC | Age: 31
End: 2021-01-28
Payer: COMMERCIAL

## 2021-01-28 DIAGNOSIS — H52.13 MYOPIA, BILATERAL: Primary | ICD-10-CM

## 2021-01-28 PROCEDURE — 92014 COMPRE OPH EXAM EST PT 1/>: CPT | Performed by: OPTOMETRIST

## 2021-01-28 ASSESSMENT — VISUAL ACUITY
OD: 20/20
OS: 20/25

## 2021-01-28 ASSESSMENT — INTRAOCULAR PRESSURE
OD: 11
OS: 13

## 2021-03-19 ENCOUNTER — APPOINTMENT (OUTPATIENT)
Age: 31
Setting detail: DERMATOLOGY
End: 2021-03-19

## 2021-03-19 DIAGNOSIS — L57.8 OTHER SKIN CHANGES DUE TO CHRONIC EXPOSURE TO NONIONIZING RADIATION: ICD-10-CM

## 2021-03-19 DIAGNOSIS — Z71.89 OTHER SPECIFIED COUNSELING: ICD-10-CM

## 2021-03-19 DIAGNOSIS — Z87.2 PERSONAL HISTORY OF DISEASES OF THE SKIN AND SUBCUTANEOUS TISSUE: ICD-10-CM

## 2021-03-19 DIAGNOSIS — D22 MELANOCYTIC NEVI: ICD-10-CM

## 2021-03-19 PROBLEM — D22.71 MELANOCYTIC NEVI OF RIGHT LOWER LIMB, INCLUDING HIP: Status: ACTIVE | Noted: 2021-03-19

## 2021-03-19 PROBLEM — D22.61 MELANOCYTIC NEVI OF RIGHT UPPER LIMB, INCLUDING SHOULDER: Status: ACTIVE | Noted: 2021-03-19

## 2021-03-19 PROBLEM — D22.5 MELANOCYTIC NEVI OF TRUNK: Status: ACTIVE | Noted: 2021-03-19

## 2021-03-19 PROBLEM — D22.62 MELANOCYTIC NEVI OF LEFT UPPER LIMB, INCLUDING SHOULDER: Status: ACTIVE | Noted: 2021-03-19

## 2021-03-19 PROBLEM — D22.39 MELANOCYTIC NEVI OF OTHER PARTS OF FACE: Status: ACTIVE | Noted: 2021-03-19

## 2021-03-19 PROBLEM — D22.72 MELANOCYTIC NEVI OF LEFT LOWER LIMB, INCLUDING HIP: Status: ACTIVE | Noted: 2021-03-19

## 2021-03-19 PROCEDURE — OTHER MIPS QUALITY: OTHER

## 2021-03-19 PROCEDURE — 99213 OFFICE O/P EST LOW 20 MIN: CPT

## 2021-03-19 PROCEDURE — OTHER OTHER: OTHER

## 2021-03-19 PROCEDURE — OTHER OBSERVATION AND MEASURE: OTHER

## 2021-03-19 PROCEDURE — OTHER ASC CONSULTATION: OTHER

## 2021-03-19 PROCEDURE — OTHER OBSERVATION: OTHER

## 2021-03-19 PROCEDURE — OTHER COUNSELING: OTHER

## 2021-03-19 ASSESSMENT — LOCATION ZONE DERM
LOCATION ZONE: TRUNK
LOCATION ZONE: FACE
LOCATION ZONE: LEG
LOCATION ZONE: ARM

## 2021-03-19 ASSESSMENT — LOCATION DETAILED DESCRIPTION DERM
LOCATION DETAILED: LEFT ANTERIOR PROXIMAL THIGH
LOCATION DETAILED: LEFT PROXIMAL PRETIBIAL REGION
LOCATION DETAILED: LEFT INFERIOR MEDIAL MIDBACK
LOCATION DETAILED: RIGHT SUPERIOR CENTRAL MALAR CHEEK
LOCATION DETAILED: RIGHT MEDIAL SUPERIOR CHEST
LOCATION DETAILED: LEFT MEDIAL SUPERIOR CHEST
LOCATION DETAILED: RIGHT ANTERIOR DISTAL THIGH
LOCATION DETAILED: LEFT LATERAL ABDOMEN
LOCATION DETAILED: GLUTEAL CLEFT
LOCATION DETAILED: LEFT ANTERIOR DISTAL THIGH
LOCATION DETAILED: LEFT PROXIMAL DORSAL FOREARM
LOCATION DETAILED: LEFT SUPERIOR UPPER BACK
LOCATION DETAILED: RIGHT ELBOW
LOCATION DETAILED: LEFT DISTAL POSTERIOR UPPER ARM
LOCATION DETAILED: RIGHT DISTAL POSTERIOR UPPER ARM
LOCATION DETAILED: INFERIOR THORACIC SPINE
LOCATION DETAILED: RIGHT PROXIMAL RADIAL DORSAL FOREARM
LOCATION DETAILED: EPIGASTRIC SKIN

## 2021-03-19 ASSESSMENT — LOCATION SIMPLE DESCRIPTION DERM
LOCATION SIMPLE: LEFT UPPER BACK
LOCATION SIMPLE: UPPER BACK
LOCATION SIMPLE: RIGHT POSTERIOR UPPER ARM
LOCATION SIMPLE: LEFT POSTERIOR UPPER ARM
LOCATION SIMPLE: LEFT PRETIBIAL REGION
LOCATION SIMPLE: RIGHT FOREARM
LOCATION SIMPLE: RIGHT ELBOW
LOCATION SIMPLE: ABDOMEN
LOCATION SIMPLE: LEFT LOWER BACK
LOCATION SIMPLE: LEFT THIGH
LOCATION SIMPLE: RIGHT CHEEK
LOCATION SIMPLE: RIGHT THIGH
LOCATION SIMPLE: LEFT FOREARM
LOCATION SIMPLE: GLUTEAL CLEFT
LOCATION SIMPLE: CHEST

## 2021-03-19 NOTE — PROCEDURE: OTHER
Render Risk Assessment In Note?: no
Detail Level: Zone
Other (Free Text): Patient is 7 months pregnant
Note Text (......Xxx Chief Complaint.): This diagnosis correlates with the

## 2021-06-12 NOTE — PROCEDURE: EXCISION
Received report from previous nurse regarding prior 12 hours.  Pt sleeping, white board updated, call light within reach.  Bed in lowest position, treaded slippers on.   Detail Level: Detailed

## 2021-09-29 ENCOUNTER — APPOINTMENT (OUTPATIENT)
Age: 31
Setting detail: DERMATOLOGY
End: 2021-10-01

## 2021-09-29 DIAGNOSIS — L91.0 HYPERTROPHIC SCAR: ICD-10-CM

## 2021-09-29 DIAGNOSIS — L65.0 TELOGEN EFFLUVIUM: ICD-10-CM

## 2021-09-29 DIAGNOSIS — D22 MELANOCYTIC NEVI: ICD-10-CM

## 2021-09-29 DIAGNOSIS — L57.8 OTHER SKIN CHANGES DUE TO CHRONIC EXPOSURE TO NONIONIZING RADIATION: ICD-10-CM

## 2021-09-29 DIAGNOSIS — Z87.2 PERSONAL HISTORY OF DISEASES OF THE SKIN AND SUBCUTANEOUS TISSUE: ICD-10-CM

## 2021-09-29 PROBLEM — D22.72 MELANOCYTIC NEVI OF LEFT LOWER LIMB, INCLUDING HIP: Status: ACTIVE | Noted: 2021-09-29

## 2021-09-29 PROBLEM — D22.71 MELANOCYTIC NEVI OF RIGHT LOWER LIMB, INCLUDING HIP: Status: ACTIVE | Noted: 2021-09-29

## 2021-09-29 PROBLEM — D22.62 MELANOCYTIC NEVI OF LEFT UPPER LIMB, INCLUDING SHOULDER: Status: ACTIVE | Noted: 2021-09-29

## 2021-09-29 PROBLEM — D22.61 MELANOCYTIC NEVI OF RIGHT UPPER LIMB, INCLUDING SHOULDER: Status: ACTIVE | Noted: 2021-09-29

## 2021-09-29 PROBLEM — D22.39 MELANOCYTIC NEVI OF OTHER PARTS OF FACE: Status: ACTIVE | Noted: 2021-09-29

## 2021-09-29 PROBLEM — D22.5 MELANOCYTIC NEVI OF TRUNK: Status: ACTIVE | Noted: 2021-09-29

## 2021-09-29 PROCEDURE — 99213 OFFICE O/P EST LOW 20 MIN: CPT

## 2021-09-29 PROCEDURE — OTHER COUNSELING: OTHER

## 2021-09-29 PROCEDURE — OTHER DEFER: OTHER

## 2021-09-29 PROCEDURE — OTHER OBSERVATION: OTHER

## 2021-09-29 PROCEDURE — OTHER OBSERVATION AND MEASURE: OTHER

## 2021-09-29 ASSESSMENT — LOCATION ZONE DERM
LOCATION ZONE: SCALP
LOCATION ZONE: LEG
LOCATION ZONE: ARM
LOCATION ZONE: TRUNK
LOCATION ZONE: FACE

## 2021-09-29 ASSESSMENT — LOCATION DETAILED DESCRIPTION DERM
LOCATION DETAILED: LEFT PROXIMAL DORSAL FOREARM
LOCATION DETAILED: LEFT PROXIMAL PRETIBIAL REGION
LOCATION DETAILED: LEFT INFERIOR MEDIAL MIDBACK
LOCATION DETAILED: LEFT DISTAL POSTERIOR UPPER ARM
LOCATION DETAILED: RIGHT DISTAL POSTERIOR UPPER ARM
LOCATION DETAILED: EPIGASTRIC SKIN
LOCATION DETAILED: RIGHT MID-UPPER BACK
LOCATION DETAILED: RIGHT ANTERIOR DISTAL THIGH
LOCATION DETAILED: LEFT ANTERIOR PROXIMAL THIGH
LOCATION DETAILED: RIGHT MEDIAL UPPER BACK
LOCATION DETAILED: RIGHT ELBOW
LOCATION DETAILED: LEFT SUPERIOR UPPER BACK
LOCATION DETAILED: RIGHT PROXIMAL RADIAL DORSAL FOREARM
LOCATION DETAILED: POSTERIOR MID-PARIETAL SCALP
LOCATION DETAILED: LEFT MEDIAL SUPERIOR CHEST
LOCATION DETAILED: INFERIOR THORACIC SPINE
LOCATION DETAILED: LEFT ANTERIOR DISTAL THIGH
LOCATION DETAILED: RIGHT SUPERIOR CENTRAL MALAR CHEEK
LOCATION DETAILED: GLUTEAL CLEFT
LOCATION DETAILED: RIGHT MEDIAL SUPERIOR CHEST
LOCATION DETAILED: LEFT LATERAL ABDOMEN

## 2021-09-29 ASSESSMENT — LOCATION SIMPLE DESCRIPTION DERM
LOCATION SIMPLE: RIGHT THIGH
LOCATION SIMPLE: RIGHT ELBOW
LOCATION SIMPLE: RIGHT POSTERIOR UPPER ARM
LOCATION SIMPLE: RIGHT CHEEK
LOCATION SIMPLE: LEFT PRETIBIAL REGION
LOCATION SIMPLE: POSTERIOR SCALP
LOCATION SIMPLE: RIGHT UPPER BACK
LOCATION SIMPLE: LEFT THIGH
LOCATION SIMPLE: ABDOMEN
LOCATION SIMPLE: CHEST
LOCATION SIMPLE: LEFT UPPER BACK
LOCATION SIMPLE: UPPER BACK
LOCATION SIMPLE: RIGHT FOREARM
LOCATION SIMPLE: LEFT LOWER BACK
LOCATION SIMPLE: GLUTEAL CLEFT
LOCATION SIMPLE: LEFT FOREARM
LOCATION SIMPLE: LEFT POSTERIOR UPPER ARM

## 2021-09-29 NOTE — PROCEDURE: OBSERVATION
Size Of Lesion In Cm (Optional): 0.3
Detail Level: Detailed
Size Of Lesion: 7mm
X Size Of Lesion In Cm (Optional): 0.5

## 2021-09-29 NOTE — HPI: FULL BODY SKIN EXAMINATION
What Is The Reason For Today's Visit?: Full Body Skin Examination
What Is The Reason For Today's Visit? (Being Monitored For X): surveillance against the recurrence of atypical nevi
Additional History: Pt is here today for a full body skin check. She states she has had many moles removed in the past, with a history of dysplastic nevi. She says the only concern today is that she is 4 months postpartum and is experiencing hair loss, and would like to know if we recommend anything.

## 2021-09-29 NOTE — PROCEDURE: DEFER
Procedure To Be Performed At Next Visit: Intralesional Kenalog
Detail Level: Simple
Introduction Text (Please End With A Colon): :

## 2021-12-17 ENCOUNTER — APPOINTMENT (OUTPATIENT)
Age: 31
Setting detail: DERMATOLOGY
End: 2021-12-23

## 2021-12-17 DIAGNOSIS — L91.0 HYPERTROPHIC SCAR: ICD-10-CM

## 2021-12-17 PROCEDURE — OTHER INTRALESIONAL KENALOG: OTHER

## 2021-12-17 PROCEDURE — 11900 INJECT SKIN LESIONS </W 7: CPT

## 2021-12-17 PROCEDURE — OTHER COUNSELING: OTHER

## 2021-12-17 ASSESSMENT — LOCATION SIMPLE DESCRIPTION DERM: LOCATION SIMPLE: RIGHT UPPER BACK

## 2021-12-17 ASSESSMENT — LOCATION DETAILED DESCRIPTION DERM: LOCATION DETAILED: RIGHT INFERIOR UPPER BACK

## 2021-12-17 ASSESSMENT — LOCATION ZONE DERM: LOCATION ZONE: TRUNK

## 2021-12-17 NOTE — PROCEDURE: INTRALESIONAL KENALOG
Medical Necessity Clause: This procedure was medically necessary because the lesions that were treated were:
X Size Of Lesion In Cm (Optional): 0
Administered By (Optional): Sole Cervantes DO
Kenalog Preparation: Kenalog with 1% lidocaine without epinephrine
Consent: The risks of atrophy were reviewed with the patient.
Detail Level: Detailed
Total Volume Injected (Ccs- Only Use Numbers And Decimals): 0.19
Validate Note Data When Using Inventory: Yes
Concentration Of Solution Injected (Mg/Ml): 5.0
Treatment Number (Optional): 1
Include Z78.9 (Other Specified Conditions Influencing Health Status) As An Associated Diagnosis?: No

## 2022-01-21 ENCOUNTER — APPOINTMENT (OUTPATIENT)
Age: 32
Setting detail: DERMATOLOGY
End: 2022-01-21

## 2022-01-21 DIAGNOSIS — L91.0 HYPERTROPHIC SCAR: ICD-10-CM

## 2022-01-21 PROCEDURE — OTHER COUNSELING: OTHER

## 2022-01-21 PROCEDURE — 99212 OFFICE O/P EST SF 10 MIN: CPT

## 2022-01-21 PROCEDURE — OTHER TREATMENT REGIMEN: OTHER

## 2022-01-21 ASSESSMENT — LOCATION ZONE DERM: LOCATION ZONE: TRUNK

## 2022-01-21 ASSESSMENT — LOCATION DETAILED DESCRIPTION DERM: LOCATION DETAILED: RIGHT SUPERIOR UPPER BACK

## 2022-01-21 ASSESSMENT — LOCATION SIMPLE DESCRIPTION DERM: LOCATION SIMPLE: RIGHT UPPER BACK

## 2022-01-28 ENCOUNTER — OFFICE VISIT (OUTPATIENT)
Dept: URBAN - METROPOLITAN AREA CLINIC 32 | Facility: CLINIC | Age: 32
End: 2022-01-28
Payer: COMMERCIAL

## 2022-01-28 PROCEDURE — 92310 CONTACT LENS FITTING OU: CPT | Performed by: OPTOMETRIST

## 2022-01-28 PROCEDURE — 92014 COMPRE OPH EXAM EST PT 1/>: CPT | Performed by: OPTOMETRIST

## 2022-01-28 ASSESSMENT — INTRAOCULAR PRESSURE
OD: 14
OS: 17

## 2022-03-31 ENCOUNTER — APPOINTMENT (OUTPATIENT)
Age: 32
Setting detail: DERMATOLOGY
End: 2022-04-01

## 2022-03-31 ENCOUNTER — APPOINTMENT (OUTPATIENT)
Age: 32
Setting detail: DERMATOLOGY
End: 2022-03-31

## 2022-03-31 DIAGNOSIS — D485 NEOPLASM OF UNCERTAIN BEHAVIOR OF SKIN: ICD-10-CM

## 2022-03-31 DIAGNOSIS — L82.1 OTHER SEBORRHEIC KERATOSIS: ICD-10-CM

## 2022-03-31 DIAGNOSIS — L57.8 OTHER SKIN CHANGES DUE TO CHRONIC EXPOSURE TO NONIONIZING RADIATION: ICD-10-CM

## 2022-03-31 DIAGNOSIS — Z71.89 OTHER SPECIFIED COUNSELING: ICD-10-CM

## 2022-03-31 DIAGNOSIS — D18.0 HEMANGIOMA: ICD-10-CM

## 2022-03-31 DIAGNOSIS — D22 MELANOCYTIC NEVI: ICD-10-CM

## 2022-03-31 PROBLEM — D22.72 MELANOCYTIC NEVI OF LEFT LOWER LIMB, INCLUDING HIP: Status: ACTIVE | Noted: 2022-03-31

## 2022-03-31 PROBLEM — D18.01 HEMANGIOMA OF SKIN AND SUBCUTANEOUS TISSUE: Status: ACTIVE | Noted: 2022-03-31

## 2022-03-31 PROBLEM — D22.71 MELANOCYTIC NEVI OF RIGHT LOWER LIMB, INCLUDING HIP: Status: ACTIVE | Noted: 2022-03-31

## 2022-03-31 PROBLEM — D22.61 MELANOCYTIC NEVI OF RIGHT UPPER LIMB, INCLUDING SHOULDER: Status: ACTIVE | Noted: 2022-03-31

## 2022-03-31 PROBLEM — D48.5 NEOPLASM OF UNCERTAIN BEHAVIOR OF SKIN: Status: ACTIVE | Noted: 2022-03-31

## 2022-03-31 PROBLEM — D22.62 MELANOCYTIC NEVI OF LEFT UPPER LIMB, INCLUDING SHOULDER: Status: ACTIVE | Noted: 2022-03-31

## 2022-03-31 PROBLEM — D22.5 MELANOCYTIC NEVI OF TRUNK: Status: ACTIVE | Noted: 2022-03-31

## 2022-03-31 PROCEDURE — 11102 TANGNTL BX SKIN SINGLE LES: CPT

## 2022-03-31 PROCEDURE — 99213 OFFICE O/P EST LOW 20 MIN: CPT | Mod: 25

## 2022-03-31 PROCEDURE — 11103 TANGNTL BX SKIN EA SEP/ADDL: CPT

## 2022-03-31 PROCEDURE — OTHER BIOPSY BY SHAVE METHOD: OTHER

## 2022-03-31 PROCEDURE — OTHER COUNSELING: OTHER

## 2022-03-31 PROCEDURE — OTHER SUNSCREEN RECOMMENDATIONS: OTHER

## 2022-03-31 ASSESSMENT — LOCATION SIMPLE DESCRIPTION DERM
LOCATION SIMPLE: LEFT THIGH
LOCATION SIMPLE: RIGHT THIGH
LOCATION SIMPLE: RIGHT UPPER BACK
LOCATION SIMPLE: ABDOMEN
LOCATION SIMPLE: RIGHT UPPER ARM
LOCATION SIMPLE: UPPER BACK
LOCATION SIMPLE: RIGHT FOREARM
LOCATION SIMPLE: LEFT FOREARM
LOCATION SIMPLE: CHEST
LOCATION SIMPLE: RIGHT CHEEK
LOCATION SIMPLE: LEFT UPPER BACK

## 2022-03-31 ASSESSMENT — LOCATION DETAILED DESCRIPTION DERM
LOCATION DETAILED: LEFT SUPERIOR UPPER BACK
LOCATION DETAILED: SUBXIPHOID
LOCATION DETAILED: RIGHT ANTERIOR DISTAL THIGH
LOCATION DETAILED: RIGHT INFERIOR CENTRAL MALAR CHEEK
LOCATION DETAILED: LEFT VENTRAL DISTAL FOREARM
LOCATION DETAILED: LEFT ANTERIOR PROXIMAL THIGH
LOCATION DETAILED: RIGHT ANTERIOR DISTAL UPPER ARM
LOCATION DETAILED: LOWER STERNUM
LOCATION DETAILED: RIGHT VENTRAL DISTAL FOREARM
LOCATION DETAILED: LEFT VENTRAL PROXIMAL FOREARM
LOCATION DETAILED: MIDDLE STERNUM
LOCATION DETAILED: LEFT LATERAL ABDOMEN
LOCATION DETAILED: LEFT ANTERIOR DISTAL THIGH
LOCATION DETAILED: RIGHT SUPERIOR MEDIAL UPPER BACK
LOCATION DETAILED: RIGHT ANTERIOR PROXIMAL THIGH
LOCATION DETAILED: INFERIOR THORACIC SPINE

## 2022-03-31 ASSESSMENT — LOCATION ZONE DERM
LOCATION ZONE: LEG
LOCATION ZONE: FACE
LOCATION ZONE: TRUNK
LOCATION ZONE: ARM

## 2022-03-31 NOTE — HPI: FULL BODY SKIN EXAMINATION
How Severe Are Your Spot(S)?: moderate
What Is The Reason For Today's Visit?: Annual Full Body Skin Examination with No Concerns
What Is The Reason For Today's Visit? (Being Monitored For X): surveillance against the recurrence of atypical nevi
Additional History: Pt states she is here for her full body skin exam. She is concerned about her largest mole on her back which has changed in size and color.

## 2022-03-31 NOTE — PROCEDURE: BIOPSY BY SHAVE METHOD
Validate That Anesthesia Is Not 0: No
X Size Of Lesion In Cm: 0
Anesthesia Type: 1% lidocaine with epinephrine
Electrodesiccation And Curettage Text: The wound bed was treated with electrodesiccation and curettage after the biopsy was performed.
Silver Nitrate Text: The wound bed was treated with silver nitrate after the biopsy was performed.
Depth Of Biopsy: dermis
Biopsy Method: Dermablade
Billing Type: Third-Party Bill
Detail Level: Detailed
Notification Instructions: Patient will be notified of biopsy results. However, patient instructed to call the office if not contacted within 2 weeks.
Dressing: bandage
Consent: Written consent was obtained and risks were reviewed including but not limited to scarring, infection, bleeding, scabbing, incomplete removal, nerve damage and allergy to anesthesia.
Information: Selecting Yes will display possible errors in your note based on the variables you have selected. This validation is only offered as a suggestion for you. PLEASE NOTE THAT THE VALIDATION TEXT WILL BE REMOVED WHEN YOU FINALIZE YOUR NOTE. IF YOU WANT TO FAX A PRELIMINARY NOTE YOU WILL NEED TO TOGGLE THIS TO 'NO' IF YOU DO NOT WANT IT IN YOUR FAXED NOTE.
Cryotherapy Text: The wound bed was treated with cryotherapy after the biopsy was performed.
Was A Bandage Applied: Yes
Post-Care Instructions: I reviewed with the patient in detail post-care instructions. Patient is to keep the biopsy site dry overnight, and then apply bacitracin twice daily until healed. Patient may apply hydrogen peroxide soaks to remove any crusting.
Biopsy Type: H and E
Wound Care: Petrolatum
Type Of Destruction Used: Curettage
Electrodesiccation Text: The wound bed was treated with electrodesiccation after the biopsy was performed.
Anesthesia Volume In Cc (Will Not Render If 0): 0.5
Hemostasis: Drysol
Curettage Text: The wound bed was treated with curettage after the biopsy was performed.

## 2022-12-01 ENCOUNTER — APPOINTMENT (OUTPATIENT)
Dept: URBAN - METROPOLITAN AREA CLINIC 223 | Age: 32
Setting detail: DERMATOLOGY
End: 2022-12-01

## 2022-12-01 DIAGNOSIS — L57.8 OTHER SKIN CHANGES DUE TO CHRONIC EXPOSURE TO NONIONIZING RADIATION: ICD-10-CM

## 2022-12-01 DIAGNOSIS — Z71.89 OTHER SPECIFIED COUNSELING: ICD-10-CM

## 2022-12-01 DIAGNOSIS — D49.2 NEOPLASM OF UNSPECIFIED BEHAVIOR OF BONE, SOFT TISSUE, AND SKIN: ICD-10-CM

## 2022-12-01 DIAGNOSIS — D18.0 HEMANGIOMA: ICD-10-CM

## 2022-12-01 DIAGNOSIS — Z87.2 PERSONAL HISTORY OF DISEASES OF THE SKIN AND SUBCUTANEOUS TISSUE: ICD-10-CM

## 2022-12-01 DIAGNOSIS — D22 MELANOCYTIC NEVI: ICD-10-CM

## 2022-12-01 DIAGNOSIS — L82.1 OTHER SEBORRHEIC KERATOSIS: ICD-10-CM

## 2022-12-01 PROBLEM — D22.72 MELANOCYTIC NEVI OF LEFT LOWER LIMB, INCLUDING HIP: Status: ACTIVE | Noted: 2022-12-01

## 2022-12-01 PROBLEM — D22.71 MELANOCYTIC NEVI OF RIGHT LOWER LIMB, INCLUDING HIP: Status: ACTIVE | Noted: 2022-12-01

## 2022-12-01 PROBLEM — D22.61 MELANOCYTIC NEVI OF RIGHT UPPER LIMB, INCLUDING SHOULDER: Status: ACTIVE | Noted: 2022-12-01

## 2022-12-01 PROBLEM — D22.5 MELANOCYTIC NEVI OF TRUNK: Status: ACTIVE | Noted: 2022-12-01

## 2022-12-01 PROBLEM — D18.01 HEMANGIOMA OF SKIN AND SUBCUTANEOUS TISSUE: Status: ACTIVE | Noted: 2022-12-01

## 2022-12-01 PROBLEM — D22.62 MELANOCYTIC NEVI OF LEFT UPPER LIMB, INCLUDING SHOULDER: Status: ACTIVE | Noted: 2022-12-01

## 2022-12-01 PROCEDURE — OTHER COUNSELING: OTHER

## 2022-12-01 PROCEDURE — 11103 TANGNTL BX SKIN EA SEP/ADDL: CPT

## 2022-12-01 PROCEDURE — 99213 OFFICE O/P EST LOW 20 MIN: CPT | Mod: 25

## 2022-12-01 PROCEDURE — OTHER MIPS QUALITY: OTHER

## 2022-12-01 PROCEDURE — OTHER BIOPSY BY SHAVE METHOD: OTHER

## 2022-12-01 PROCEDURE — 11102 TANGNTL BX SKIN SINGLE LES: CPT

## 2022-12-01 PROCEDURE — OTHER SUNSCREEN RECOMMENDATIONS: OTHER

## 2022-12-01 ASSESSMENT — LOCATION DETAILED DESCRIPTION DERM
LOCATION DETAILED: LEFT RADIAL DORSAL HAND
LOCATION DETAILED: LEFT INFERIOR CENTRAL MALAR CHEEK
LOCATION DETAILED: RIGHT ANTERIOR PROXIMAL THIGH
LOCATION DETAILED: RIGHT ANTERIOR DISTAL THIGH
LOCATION DETAILED: SUBXIPHOID
LOCATION DETAILED: EPIGASTRIC SKIN
LOCATION DETAILED: LEFT PROXIMAL DORSAL FOREARM
LOCATION DETAILED: SUPERIOR LUMBAR SPINE
LOCATION DETAILED: LEFT ANTERIOR PROXIMAL THIGH
LOCATION DETAILED: LEFT PROXIMAL POSTERIOR UPPER ARM
LOCATION DETAILED: RIGHT PROXIMAL POSTERIOR UPPER ARM
LOCATION DETAILED: RIGHT PROXIMAL DORSAL FOREARM
LOCATION DETAILED: RIGHT SUPERIOR UPPER BACK
LOCATION DETAILED: RIGHT RADIAL DORSAL HAND
LOCATION DETAILED: LEFT SUPERIOR UPPER BACK
LOCATION DETAILED: RIGHT MEDIAL FOREHEAD
LOCATION DETAILED: RIGHT INFERIOR CENTRAL MALAR CHEEK
LOCATION DETAILED: RIGHT SUPERIOR LATERAL LOWER BACK
LOCATION DETAILED: UPPER STERNUM
LOCATION DETAILED: RIGHT POSTERIOR LATERAL DISTAL THIGH
LOCATION DETAILED: PERIUMBILICAL SKIN
LOCATION DETAILED: LEFT INFERIOR MEDIAL MIDBACK

## 2022-12-01 ASSESSMENT — LOCATION SIMPLE DESCRIPTION DERM
LOCATION SIMPLE: LEFT LOWER BACK
LOCATION SIMPLE: LOWER BACK
LOCATION SIMPLE: RIGHT POSTERIOR UPPER ARM
LOCATION SIMPLE: LEFT FOREARM
LOCATION SIMPLE: RIGHT CHEEK
LOCATION SIMPLE: RIGHT HAND
LOCATION SIMPLE: RIGHT UPPER BACK
LOCATION SIMPLE: RIGHT LOWER BACK
LOCATION SIMPLE: LEFT CHEEK
LOCATION SIMPLE: RIGHT THIGH
LOCATION SIMPLE: CHEST
LOCATION SIMPLE: ABDOMEN
LOCATION SIMPLE: RIGHT FOREHEAD
LOCATION SIMPLE: LEFT THIGH
LOCATION SIMPLE: LEFT HAND
LOCATION SIMPLE: RIGHT FOREARM
LOCATION SIMPLE: LEFT POSTERIOR UPPER ARM
LOCATION SIMPLE: LEFT UPPER BACK

## 2022-12-01 ASSESSMENT — LOCATION ZONE DERM
LOCATION ZONE: TRUNK
LOCATION ZONE: ARM
LOCATION ZONE: FACE
LOCATION ZONE: LEG
LOCATION ZONE: HAND

## 2022-12-01 NOTE — PROCEDURE: BIOPSY BY SHAVE METHOD
Cryotherapy Text: The wound bed was treated with cryotherapy after the biopsy was performed.
Dressing: bandage
Biopsy Method: Dermablade
Anesthesia Type: 1% lidocaine with epinephrine
Hide Biopsy Depth?: No
Wound Care: Petrolatum
Type Of Destruction Used: Curettage
Billing Type: Third-Party Bill
Curettage Text: The wound bed was treated with curettage after the biopsy was performed.
Depth Of Biopsy: dermis
Notification Instructions: Patient will be notified of biopsy results. However, patient instructed to call the office if not contacted within 2 weeks.
Anesthesia Volume In Cc (Will Not Render If 0): 0.5
Silver Nitrate Text: The wound bed was treated with silver nitrate after the biopsy was performed.
Was A Bandage Applied: Yes
Additional Anesthesia Volume In Cc (Will Not Render If 0): 0
Consent: Written consent was obtained and risks were reviewed including but not limited to scarring, infection, bleeding, scabbing, incomplete removal, nerve damage and allergy to anesthesia.
Biopsy Type: H and E
Electrodesiccation And Curettage Text: The wound bed was treated with electrodesiccation and curettage after the biopsy was performed.
Hemostasis: Drysol
Information: Selecting Yes will display possible errors in your note based on the variables you have selected. This validation is only offered as a suggestion for you. PLEASE NOTE THAT THE VALIDATION TEXT WILL BE REMOVED WHEN YOU FINALIZE YOUR NOTE. IF YOU WANT TO FAX A PRELIMINARY NOTE YOU WILL NEED TO TOGGLE THIS TO 'NO' IF YOU DO NOT WANT IT IN YOUR FAXED NOTE.
Post-Care Instructions: I reviewed with the patient in detail post-care instructions. Patient is to keep the biopsy site dry overnight, and then apply bacitracin twice daily until healed. Patient may apply hydrogen peroxide soaks to remove any crusting.
Size Of Lesion In Cm: 0.6
Detail Level: Detailed
Electrodesiccation Text: The wound bed was treated with electrodesiccation after the biopsy was performed.
Size Of Lesion In Cm: 0.4

## 2022-12-01 NOTE — PROCEDURE: COUNSELING
Detail Level: Generalized
Skin Checks Recommendations: Patient is recommended to have annual full skin exams. Patient should return to the clinic sooner with any new or concerning lesions.
Detail Level: Detailed
Detail Level: Zone

## 2022-12-22 ENCOUNTER — APPOINTMENT (OUTPATIENT)
Dept: URBAN - METROPOLITAN AREA CLINIC 223 | Age: 32
Setting detail: DERMATOLOGY
End: 2022-12-22

## 2022-12-28 ENCOUNTER — APPOINTMENT (OUTPATIENT)
Dept: URBAN - METROPOLITAN AREA CLINIC 223 | Age: 32
Setting detail: DERMATOLOGY
End: 2022-12-28

## 2022-12-28 DIAGNOSIS — D22 MELANOCYTIC NEVI: ICD-10-CM

## 2022-12-28 PROBLEM — D22.71 MELANOCYTIC NEVI OF RIGHT LOWER LIMB, INCLUDING HIP: Status: ACTIVE | Noted: 2022-12-28

## 2022-12-28 PROCEDURE — OTHER PRESCRIPTION: OTHER

## 2022-12-28 PROCEDURE — 11401 EXC TR-EXT B9+MARG 0.6-1 CM: CPT

## 2022-12-28 PROCEDURE — OTHER EXCISION: OTHER

## 2022-12-28 PROCEDURE — 12032 INTMD RPR S/A/T/EXT 2.6-7.5: CPT

## 2022-12-28 PROCEDURE — OTHER MIPS QUALITY: OTHER

## 2022-12-28 PROCEDURE — OTHER COUNSELING: OTHER

## 2022-12-28 RX ORDER — MUPIROCIN 20 MG/G
OINTMENT TOPICAL
Qty: 22 | Refills: 0 | Status: ERX | COMMUNITY
Start: 2022-12-28

## 2022-12-28 ASSESSMENT — LOCATION DETAILED DESCRIPTION DERM: LOCATION DETAILED: RIGHT POSTERIOR LATERAL DISTAL THIGH

## 2022-12-28 ASSESSMENT — LOCATION ZONE DERM: LOCATION ZONE: LEG

## 2022-12-28 ASSESSMENT — LOCATION SIMPLE DESCRIPTION DERM: LOCATION SIMPLE: RIGHT THIGH

## 2022-12-28 NOTE — PROCEDURE: EXCISION
Reason for Call:  Medication or medication refill:    Do you use a [facility]?  Name of the pharmacy and phone number for the current request:  [id number] - [facility], [place] - [address]    Name of the medication requested: dextroamphetamine-amphetamine (ADDERALL XR) 20 MG 24 hr capsule    Other request: n/a    Can we leave a detailed message on this number? YES    Phone number patient can be reached at: Cell number on file:    Telephone Information:   Mobile [phone]       Best Time: any    Call taken on 7/19/2021 at 1:58 PM by Darryl Acevedo     Anesthesia Volume In Cc: 0

## 2022-12-28 NOTE — PROCEDURE: EXCISION
Refill Adderall XR 30mg pending Physician approval, script cannot be e-scribed, please route back to pool after completed so patient can be called when ready for  [phone].    Patient last seen and last prescribed 2/3/17     Rhombic Flap Text: The defect edges were debeveled with a #15 scalpel blade.  Given the location of the defect and the proximity to free margins a rhombic flap was deemed most appropriate.  Using a sterile surgical marker, an appropriate rhombic flap was drawn incorporating the defect.    The area thus outlined was incised deep to adipose tissue with a #15 scalpel blade.  The skin margins were undermined to an appropriate distance in all directions utilizing iris scissors.

## 2023-04-04 ENCOUNTER — CLINICAL SUPPORT (OUTPATIENT)
Dept: OTHER | Facility: CLINIC | Age: 33
End: 2023-04-04

## 2023-04-04 DIAGNOSIS — Z00.8 ENCOUNTER FOR OTHER GENERAL EXAMINATION: ICD-10-CM

## 2023-04-05 VITALS
HEIGHT: 70 IN | DIASTOLIC BLOOD PRESSURE: 70 MMHG | BODY MASS INDEX: 23.91 KG/M2 | WEIGHT: 167 LBS | SYSTOLIC BLOOD PRESSURE: 116 MMHG

## 2023-04-05 LAB
GLUCOSE SERPL-MCNC: 83 MG/DL (ref 60–140)
HDLC SERPL-MCNC: 49 MG/DL
POC CHOLESTEROL, LDL (DOCK): 83 MG/DL
POC CHOLESTEROL, TOTAL: 148 MG/DL
TRIGL SERPL-MCNC: 85 MG/DL

## 2023-06-28 NOTE — PROCEDURE: EXCISION
What Type Of Note Output Would You Prefer (Optional)?: Standard Output How Severe Are Your Spot(S)?: mild Have Your Spot(S) Been Treated In The Past?: has not been treated Hpi Title: Evaluation of Skin Lesions Medical Necessity Clause: This procedure was medically necessary because the lesion that was treated was:

## 2023-10-05 PROBLEM — M25.471 PAIN AND SWELLING OF ANKLE, RIGHT: Status: ACTIVE | Noted: 2023-10-05

## 2023-10-05 PROBLEM — S93.491A SPRAIN OF ANTERIOR TALOFIBULAR LIGAMENT OF RIGHT ANKLE: Status: ACTIVE | Noted: 2023-10-05

## 2023-10-05 PROBLEM — M25.571 PAIN AND SWELLING OF ANKLE, RIGHT: Status: ACTIVE | Noted: 2023-10-05

## 2024-08-22 ENCOUNTER — PATIENT MESSAGE (OUTPATIENT)
Dept: ADMINISTRATIVE | Facility: OTHER | Age: 34
End: 2024-08-22

## 2024-08-27 PROBLEM — G47.33 OSA (OBSTRUCTIVE SLEEP APNEA): Status: ACTIVE | Noted: 2024-08-27

## 2024-08-27 PROBLEM — K21.9 GASTROESOPHAGEAL REFLUX DISEASE: Status: ACTIVE | Noted: 2024-08-27

## 2024-08-29 ENCOUNTER — PATIENT MESSAGE (OUTPATIENT)
Dept: ADMINISTRATIVE | Facility: OTHER | Age: 34
End: 2024-08-29

## 2024-09-13 PROBLEM — Z34.90 PREGNANCY: Status: ACTIVE | Noted: 2024-09-13

## 2024-09-13 PROBLEM — Z78.9 INTOLERANCE OF CONTINUOUS POSITIVE AIRWAY PRESSURE (CPAP) VENTILATION: Status: ACTIVE | Noted: 2024-09-13

## 2024-09-13 PROBLEM — R06.83 SNORING: Status: ACTIVE | Noted: 2024-09-13

## 2024-09-13 PROBLEM — Z34.90 PREGNANT: Status: ACTIVE | Noted: 2021-01-07

## 2024-09-13 PROBLEM — R53.83 FATIGUE: Status: ACTIVE | Noted: 2024-09-13

## 2024-09-13 PROBLEM — R06.81 WITNESSED EPISODE OF APNEA: Status: ACTIVE | Noted: 2024-09-13

## 2024-09-13 PROBLEM — G47.19 EXCESSIVE DAYTIME SLEEPINESS: Status: ACTIVE | Noted: 2024-09-13

## 2024-10-30 PROBLEM — Z36.86 ENCOUNTER FOR ANTENATAL SCREENING FOR CERVICAL LENGTH: Status: ACTIVE | Noted: 2024-10-30

## 2024-11-21 ENCOUNTER — PATIENT MESSAGE (OUTPATIENT)
Dept: ADMINISTRATIVE | Facility: OTHER | Age: 34
End: 2024-11-21

## 2024-12-12 ENCOUNTER — PATIENT MESSAGE (OUTPATIENT)
Dept: ADMINISTRATIVE | Facility: OTHER | Age: 34
End: 2024-12-12